# Patient Record
Sex: FEMALE | URBAN - METROPOLITAN AREA
[De-identification: names, ages, dates, MRNs, and addresses within clinical notes are randomized per-mention and may not be internally consistent; named-entity substitution may affect disease eponyms.]

---

## 2018-05-07 PROBLEM — Z00.00 ENCOUNTER FOR PREVENTIVE HEALTH EXAMINATION: Status: ACTIVE | Noted: 2018-05-07

## 2018-05-08 ENCOUNTER — OUTPATIENT (OUTPATIENT)
Dept: OUTPATIENT SERVICES | Facility: HOSPITAL | Age: 68
LOS: 1 days | Discharge: HOME | End: 2018-05-08
Payer: MEDICARE

## 2018-05-09 ENCOUNTER — APPOINTMENT (OUTPATIENT)
Dept: RADIATION ONCOLOGY | Facility: CLINIC | Age: 68
End: 2018-05-09
Payer: MEDICARE

## 2018-05-09 VITALS
TEMPERATURE: 98.3 F | OXYGEN SATURATION: 98 % | RESPIRATION RATE: 16 BRPM | DIASTOLIC BLOOD PRESSURE: 63 MMHG | BODY MASS INDEX: 25.21 KG/M2 | WEIGHT: 137 LBS | HEART RATE: 73 BPM | SYSTOLIC BLOOD PRESSURE: 122 MMHG | HEIGHT: 62 IN

## 2018-05-09 DIAGNOSIS — C80.1 SECONDARY MALIGNANT NEOPLASM OF LIVER AND INTRAHEPATIC BILE DUCT: ICD-10-CM

## 2018-05-09 DIAGNOSIS — Z86.79 PERSONAL HISTORY OF OTHER DISEASES OF THE CIRCULATORY SYSTEM: ICD-10-CM

## 2018-05-09 DIAGNOSIS — C80.1 SECONDARY MALIGNANT NEOPLASM OF BRAIN: ICD-10-CM

## 2018-05-09 DIAGNOSIS — C78.7 SECONDARY MALIGNANT NEOPLASM OF LIVER AND INTRAHEPATIC BILE DUCT: ICD-10-CM

## 2018-05-09 DIAGNOSIS — Z80.0 FAMILY HISTORY OF MALIGNANT NEOPLASM OF DIGESTIVE ORGANS: ICD-10-CM

## 2018-05-09 DIAGNOSIS — C79.31 SECONDARY MALIGNANT NEOPLASM OF BRAIN: ICD-10-CM

## 2018-05-09 PROCEDURE — 99204 OFFICE O/P NEW MOD 45 MIN: CPT | Mod: 25

## 2018-05-09 RX ORDER — SENNOSIDES 8.6 MG TABLETS 8.6 MG/1
8.6 TABLET ORAL
Refills: 0 | Status: ACTIVE | COMMUNITY

## 2018-05-10 ENCOUNTER — OTHER (OUTPATIENT)
Age: 68
End: 2018-05-10

## 2018-05-11 ENCOUNTER — APPOINTMENT (OUTPATIENT)
Dept: HEMATOLOGY ONCOLOGY | Facility: CLINIC | Age: 68
End: 2018-05-11
Payer: MEDICARE

## 2018-05-11 ENCOUNTER — OUTPATIENT (OUTPATIENT)
Dept: OUTPATIENT SERVICES | Facility: HOSPITAL | Age: 68
LOS: 1 days | Discharge: HOME | End: 2018-05-11

## 2018-05-11 VITALS
TEMPERATURE: 97.8 F | SYSTOLIC BLOOD PRESSURE: 109 MMHG | OXYGEN SATURATION: 98 % | BODY MASS INDEX: 25.21 KG/M2 | HEIGHT: 62 IN | WEIGHT: 137 LBS | HEART RATE: 58 BPM | DIASTOLIC BLOOD PRESSURE: 67 MMHG

## 2018-05-11 DIAGNOSIS — C34.90 MALIGNANT NEOPLASM OF UNSPECIFIED PART OF UNSPECIFIED BRONCHUS OR LUNG: ICD-10-CM

## 2018-05-11 PROCEDURE — 99205 OFFICE O/P NEW HI 60 MIN: CPT

## 2018-05-15 ENCOUNTER — APPOINTMENT (OUTPATIENT)
Dept: NEUROSURGERY | Facility: CLINIC | Age: 68
End: 2018-05-15
Payer: MEDICARE

## 2018-05-15 VITALS
TEMPERATURE: 97.7 F | HEIGHT: 62 IN | WEIGHT: 137 LBS | DIASTOLIC BLOOD PRESSURE: 70 MMHG | OXYGEN SATURATION: 98 % | BODY MASS INDEX: 25.21 KG/M2 | HEART RATE: 62 BPM | SYSTOLIC BLOOD PRESSURE: 122 MMHG

## 2018-05-15 PROCEDURE — 99204 OFFICE O/P NEW MOD 45 MIN: CPT

## 2018-05-15 PROCEDURE — 77334 RADIATION TREATMENT AID(S): CPT | Mod: 26

## 2018-05-15 PROCEDURE — 77263 THER RADIOLOGY TX PLNG CPLX: CPT

## 2018-05-15 PROCEDURE — 77290 THER RAD SIMULAJ FIELD CPLX: CPT | Mod: 26

## 2018-05-16 ENCOUNTER — OTHER (OUTPATIENT)
Age: 68
End: 2018-05-16

## 2018-05-16 PROCEDURE — 77307 TELETHX ISODOSE PLAN CPLX: CPT | Mod: 26

## 2018-05-16 PROCEDURE — 77334 RADIATION TREATMENT AID(S): CPT | Mod: 26

## 2018-05-17 ENCOUNTER — OUTPATIENT (OUTPATIENT)
Dept: OUTPATIENT SERVICES | Facility: HOSPITAL | Age: 68
LOS: 1 days | Discharge: HOME | End: 2018-05-17
Payer: MEDICARE

## 2018-05-17 ENCOUNTER — INPATIENT (INPATIENT)
Facility: HOSPITAL | Age: 68
LOS: 7 days | Discharge: HOME | DRG: 54 | End: 2018-05-25
Attending: HOSPITALIST | Admitting: INTERNAL MEDICINE
Payer: MEDICARE

## 2018-05-17 VITALS — WEIGHT: 136.03 LBS | HEIGHT: 62 IN

## 2018-05-17 VITALS
OXYGEN SATURATION: 98 % | SYSTOLIC BLOOD PRESSURE: 118 MMHG | HEIGHT: 62 IN | WEIGHT: 137 LBS | RESPIRATION RATE: 16 BRPM | BODY MASS INDEX: 25.21 KG/M2 | HEART RATE: 79 BPM | DIASTOLIC BLOOD PRESSURE: 67 MMHG

## 2018-05-17 DIAGNOSIS — R29.6 REPEATED FALLS: ICD-10-CM

## 2018-05-17 DIAGNOSIS — Z51.5 ENCOUNTER FOR PALLIATIVE CARE: ICD-10-CM

## 2018-05-17 DIAGNOSIS — Z98.890 OTHER SPECIFIED POSTPROCEDURAL STATES: Chronic | ICD-10-CM

## 2018-05-17 DIAGNOSIS — C79.31 SECONDARY MALIGNANT NEOPLASM OF BRAIN: ICD-10-CM

## 2018-05-17 DIAGNOSIS — C34.90 MALIGNANT NEOPLASM OF UNSPECIFIED PART OF UNSPECIFIED BRONCHUS OR LUNG: ICD-10-CM

## 2018-05-17 LAB
ALBUMIN SERPL ELPH-MCNC: 3.8 G/DL — SIGNIFICANT CHANGE UP (ref 3.3–5.2)
ALP SERPL-CCNC: 59 U/L — SIGNIFICANT CHANGE UP (ref 40–120)
ALT FLD-CCNC: 26 U/L — SIGNIFICANT CHANGE UP
ANION GAP SERPL CALC-SCNC: 13 MMOL/L — SIGNIFICANT CHANGE UP (ref 5–17)
APPEARANCE UR: CLEAR — SIGNIFICANT CHANGE UP
APTT BLD: 23.8 SEC — LOW (ref 27.5–37.4)
AST SERPL-CCNC: 15 U/L — SIGNIFICANT CHANGE UP
BASOPHILS # BLD AUTO: 0 K/UL — SIGNIFICANT CHANGE UP (ref 0–0.2)
BASOPHILS NFR BLD AUTO: 0.1 % — SIGNIFICANT CHANGE UP (ref 0–2)
BILIRUB SERPL-MCNC: 0.7 MG/DL — SIGNIFICANT CHANGE UP (ref 0.4–2)
BILIRUB UR-MCNC: NEGATIVE — SIGNIFICANT CHANGE UP
BUN SERPL-MCNC: 22 MG/DL — HIGH (ref 8–20)
CALCIUM SERPL-MCNC: 9.1 MG/DL — SIGNIFICANT CHANGE UP (ref 8.6–10.2)
CHLORIDE SERPL-SCNC: 100 MMOL/L — SIGNIFICANT CHANGE UP (ref 98–107)
CO2 SERPL-SCNC: 25 MMOL/L — SIGNIFICANT CHANGE UP (ref 22–29)
COLOR SPEC: YELLOW — SIGNIFICANT CHANGE UP
CREAT SERPL-MCNC: 0.6 MG/DL — SIGNIFICANT CHANGE UP (ref 0.5–1.3)
DIFF PNL FLD: NEGATIVE — SIGNIFICANT CHANGE UP
EOSINOPHIL # BLD AUTO: 0 K/UL — SIGNIFICANT CHANGE UP (ref 0–0.5)
EOSINOPHIL NFR BLD AUTO: 0 % — SIGNIFICANT CHANGE UP (ref 0–6)
EPI CELLS # UR: SIGNIFICANT CHANGE UP
GLUCOSE SERPL-MCNC: 132 MG/DL — HIGH (ref 70–115)
GLUCOSE UR QL: NEGATIVE MG/DL — SIGNIFICANT CHANGE UP
HCT VFR BLD CALC: 41.4 % — SIGNIFICANT CHANGE UP (ref 37–47)
HGB BLD-MCNC: 13.8 G/DL — SIGNIFICANT CHANGE UP (ref 12–16)
INR BLD: 0.95 RATIO — SIGNIFICANT CHANGE UP (ref 0.88–1.16)
KETONES UR-MCNC: NEGATIVE — SIGNIFICANT CHANGE UP
LEUKOCYTE ESTERASE UR-ACNC: ABNORMAL
LYMPHOCYTES # BLD AUTO: 1 K/UL — SIGNIFICANT CHANGE UP (ref 1–4.8)
LYMPHOCYTES # BLD AUTO: 8.1 % — LOW (ref 20–55)
MCHC RBC-ENTMCNC: 31.4 PG — HIGH (ref 27–31)
MCHC RBC-ENTMCNC: 33.3 G/DL — SIGNIFICANT CHANGE UP (ref 32–36)
MCV RBC AUTO: 94.3 FL — SIGNIFICANT CHANGE UP (ref 81–99)
MONOCYTES # BLD AUTO: 0.7 K/UL — SIGNIFICANT CHANGE UP (ref 0–0.8)
MONOCYTES NFR BLD AUTO: 5.7 % — SIGNIFICANT CHANGE UP (ref 3–10)
NEUTROPHILS # BLD AUTO: 10.5 K/UL — HIGH (ref 1.8–8)
NEUTROPHILS NFR BLD AUTO: 85.6 % — HIGH (ref 37–73)
NITRITE UR-MCNC: NEGATIVE — SIGNIFICANT CHANGE UP
PH UR: 5 — SIGNIFICANT CHANGE UP (ref 5–8)
PLATELET # BLD AUTO: 217 K/UL — SIGNIFICANT CHANGE UP (ref 150–400)
POTASSIUM SERPL-MCNC: 4.2 MMOL/L — SIGNIFICANT CHANGE UP (ref 3.5–5.3)
POTASSIUM SERPL-SCNC: 4.2 MMOL/L — SIGNIFICANT CHANGE UP (ref 3.5–5.3)
PROT SERPL-MCNC: 6.5 G/DL — LOW (ref 6.6–8.7)
PROT UR-MCNC: 15 MG/DL
PROTHROM AB SERPL-ACNC: 10.4 SEC — SIGNIFICANT CHANGE UP (ref 9.8–12.7)
RBC # BLD: 4.39 M/UL — LOW (ref 4.4–5.2)
RBC # FLD: 15.3 % — SIGNIFICANT CHANGE UP (ref 11–15.6)
SODIUM SERPL-SCNC: 138 MMOL/L — SIGNIFICANT CHANGE UP (ref 135–145)
SP GR SPEC: 1.02 — SIGNIFICANT CHANGE UP (ref 1.01–1.02)
UROBILINOGEN FLD QL: NEGATIVE MG/DL — SIGNIFICANT CHANGE UP
WBC # BLD: 12.3 K/UL — HIGH (ref 4.8–10.8)
WBC # FLD AUTO: 12.3 K/UL — HIGH (ref 4.8–10.8)
WBC UR QL: SIGNIFICANT CHANGE UP

## 2018-05-17 PROCEDURE — 99497 ADVNCD CARE PLAN 30 MIN: CPT | Mod: 25

## 2018-05-17 PROCEDURE — 99285 EMERGENCY DEPT VISIT HI MDM: CPT | Mod: 25

## 2018-05-17 PROCEDURE — 70450 CT HEAD/BRAIN W/O DYE: CPT | Mod: 26

## 2018-05-17 PROCEDURE — 77280 THER RAD SIMULAJ FIELD SMPL: CPT | Mod: 26

## 2018-05-17 PROCEDURE — 99223 1ST HOSP IP/OBS HIGH 75: CPT

## 2018-05-17 PROCEDURE — 99358 PROLONG SERVICE W/O CONTACT: CPT

## 2018-05-17 RX ORDER — DEXAMETHASONE 0.5 MG/5ML
4 ELIXIR ORAL ONCE
Qty: 0 | Refills: 0 | Status: COMPLETED | OUTPATIENT
Start: 2018-05-17 | End: 2018-05-17

## 2018-05-17 RX ORDER — DEXAMETHASONE 0.5 MG/5ML
4 ELIXIR ORAL EVERY 6 HOURS
Qty: 0 | Refills: 0 | Status: DISCONTINUED | OUTPATIENT
Start: 2018-05-17 | End: 2018-05-18

## 2018-05-17 RX ORDER — PANTOPRAZOLE SODIUM 20 MG/1
40 TABLET, DELAYED RELEASE ORAL
Qty: 0 | Refills: 0 | Status: DISCONTINUED | OUTPATIENT
Start: 2018-05-17 | End: 2018-05-25

## 2018-05-17 RX ORDER — SODIUM CHLORIDE 9 MG/ML
3 INJECTION INTRAMUSCULAR; INTRAVENOUS; SUBCUTANEOUS ONCE
Qty: 0 | Refills: 0 | Status: COMPLETED | OUTPATIENT
Start: 2018-05-17 | End: 2018-05-17

## 2018-05-17 RX ORDER — ENOXAPARIN SODIUM 100 MG/ML
40 INJECTION SUBCUTANEOUS DAILY
Qty: 0 | Refills: 0 | Status: DISCONTINUED | OUTPATIENT
Start: 2018-05-17 | End: 2018-05-25

## 2018-05-17 RX ADMIN — SODIUM CHLORIDE 3 MILLILITER(S): 9 INJECTION INTRAMUSCULAR; INTRAVENOUS; SUBCUTANEOUS at 04:55

## 2018-05-17 RX ADMIN — Medication 4 MILLIGRAM(S): at 23:06

## 2018-05-17 RX ADMIN — PANTOPRAZOLE SODIUM 40 MILLIGRAM(S): 20 TABLET, DELAYED RELEASE ORAL at 17:01

## 2018-05-17 RX ADMIN — Medication 4 MILLIGRAM(S): at 13:37

## 2018-05-17 RX ADMIN — ENOXAPARIN SODIUM 40 MILLIGRAM(S): 100 INJECTION SUBCUTANEOUS at 11:16

## 2018-05-17 RX ADMIN — Medication 4 MILLIGRAM(S): at 17:02

## 2018-05-17 RX ADMIN — Medication 4 MILLIGRAM(S): at 07:33

## 2018-05-17 NOTE — H&P ADULT - ASSESSMENT
Metastatic Lung Ca- mets to brain + liver. regarding brain mets, noted to have mult supra + infratentorial brain mets containing blood products/debris w/ necrosis + Lt temporal horn effacement complicated by mass effect causing subfalcine herniation as per CTH. recently dx @ [facility] 1wk prior to admission. sp liver bx for confirmation @ [facility], pending result. complicated by severe incoordination, RUE weakness, cognitive impairment, + intermittent receptive aphasia. PLAN. radiation therapy to brain per Dr Mondragon. Palliative consult. neurosx consult appreciated, poor prognosis, no sx intervention offered @ this time. pt family cannot care for her needs @ this time given advanced disease with complications, may not be safe dc home unless regular care provided, good candidate for hospice.    Gait instability - complicated by frequent falls. most recent fall just prior to admit to Rt frontoparietal head. likely sec to brain mets causing gait isntability. . PLAN. PT/OT consult.     Leukocytosis- mild. no signs active infection. most likely sec to malingnacy. PLAN. check UA to r/o underlying UTI as poss alternative cause to symptoms although concede given extent of abn findings on CTH, it is more likely due to mets. 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     Metastatic Lung Ca- mets to brain + liver. regarding brain mets, noted to have mult supra + infratentorial brain mets containing blood products/debris w/ necrosis + Lt temporal horn effacement complicated by mass effect causing subfalcine herniation as per CTH. recently dx @ [facility] 1wk prior to admission. sp liver bx for confirmation @ [facility], pending result. complicated by severe incoordination, RUE weakness, cognitive impairment, + intermittent receptive aphasia. PLAN. radiation therapy to brain per Dr Mondragon. Palliative consult. neurosx consult appreciated, poor prognosis, no sx intervention offered @ this time. pt family cannot care for her needs @ this time given advanced disease with complications, may not be safe dc home unless regular care provided, good candidate for hospice.    Gait instability - complicated by frequent falls. most recent fall just prior to admit to Rt frontoparietal head. likely sec to brain mets causing gait isntability. . PLAN. PT/OT consult.     Leukocytosis- mild. no signs active infection. most likely sec to malingnacy. PLAN. check UA to r/o underlying UTI as poss alternative cause to symptoms although concede given extent of abn findings on CTH, it is more likely due to mets.

## 2018-05-17 NOTE — ED PROVIDER NOTE - PROGRESS NOTE DETAILS
Case d/w Radiation Oncolgy Dr. Walton who spoke with Dr. Mondragon and recommending admission with eval Palliative/Hospice Care and to continue plan to start radiation later today as scheduled.  Call placed to Hospitalist/Dr. Torres

## 2018-05-17 NOTE — CONSULT NOTE ADULT - SUBJECTIVE AND OBJECTIVE BOX
SOURCE: Patient, son, ex , Son is competent source.   HPI: Patient is a 68y old  Female who presents with a chief complaint of fall with hit to right frontoparietal head. No recall of fall, denies and head ache, dizziness, admits to frequent falls due to balance problem. Admits to RUE weakness. Patient with poor recall of recent hx. Son states she started having right hand weakness 4 wks ago that progressed proximally, now can not move r arm, she has fallen 20 times this week due to poor balance and incoordination of legs. Poor cognition and recall this week. Had work up at [facility] last week that DX lung ca with mets to liver and brain. Patient  has appt today w Dr Mondragon for rad onc.     -headache  or dizziness    - Nausea / - Vomiting  admits RUE weakness   denies numbness/ tingling   denies visual changes   denies C/T/LS  Spine pain    PAST MEDICAL:  Lung mass  Liver mass  Brain metastases     SURGICAL HISTORY:  LIVER BX  last week at [facility]      SOCIAL HISTORY:  - EtOH, +  tobacco1 1/2 PPD X'S 40+ YRS ,  - drugs    FAMILY HISTORY: NONE KNOWN      ROS:  CONSTITUTIONAL: No fever, weight loss, or fatigue  EYES: No eye pain, visual disturbances, or discharge  ENMT:  No difficulty hearing, tinnitus, vertigo; No sinus or throat pain  NECK: No pain or stiffness  RESPIRATORY: No cough, wheezing, chills or hemoptysis; No shortness of breath  CARDIOVASCULAR: No chest pain, palpitations, dizziness, or leg swelling  GASTROINTESTINAL: No abdominal or epigastric pain. No nausea, vomiting, or hematemesis; No diarrhea or constipation. No melena or hematochezia.  GENITOURINARY: No dysuria, frequency, hematuria, or incontinence  NEUROLOGICAL: No headaches, memory loss, loss of strength, numbness, or tremors  SKIN: No itching, burning, rashes, or lesions   LYMPH NODES: No enlarged glands  ENDOCRINE: No heat or cold intolerance; No hair loss  MUSCULOSKELETAL: No joint pain or swelling; No muscle, back, or extremity pain  PSYCHIATRIC: No depression, anxiety, mood swings, or difficulty sleeping  HEME/LYMPH: No easy bruising, or bleeding gums  ALLERY AND IMMUNOLOGIC: No hives or eczema      MEDICATIONS: DECADRON 4 Q12H, KEPPRA Q12H    Allergies: No Known Allergies    Vital Signs Last 24 Hrs  T(C): 36.8 (17 May 2018 02:46), Max: 36.8 (17 May 2018 02:46)  T(F): 98.2 (17 May 2018 02:46), Max: 98.2 (17 May 2018 02:46)  HR: 67 (17 May 2018 02:46) (67 - 67)  BP: 110/69 (17 May 2018 02:46) (110/69 - 110/69)  BP(mean): --  RR: 20 (17 May 2018 02:46) (20 - 20)  SpO2: 99% (17 May 2018 02:46) (99% - 99%)    PHYSICAL EXAM:  GENERAL: NAD, well-groomed, well-developed  HEAD:  RIGHT FRONTOPARIETAL HEMATOMA MIXED AGE, MINOR TENDERNESS, SKIN INTACT,  Normocephalic  EYES: EOMI, PERRLA, conjunctiva and sclera clear  NECK: Supple, No JVD  NERVOUS SYSTEM:  Alert , NOT Oriented To date, , Good concentration;  PERRLA, EOMI, GROSS VISUAL ACUITY AND FIELDS INTACT, CRANIAL NERVES 2-12 INTACT,  SPEECH INTACT, POOR RECALL, POSS RECEPTIVE APHASIA,    Motor Strength 5/5 LEFT UPPER AND LOWER EXTREM,  RUE 2/5 TRAP/DELT/BICEP/TRICEP/GRASP  SENSORY INTACT ALL, ; DTRs 2+ intact and symmetric, UNABLE TO FOLLOW INSTRUCTIONS FOR FINE MOTOR AND COORDINATION  CHEST/LUNG: Clear bilaterally; No rales, rhonchi, wheezing, or rubs, NO STERNAL OR RIB TENDERNESS  SPINE: NO C/T/L/S SPINE TENDERNESS  HEART: Regular rate   ABDOMEN: Soft, Nontender, Nondistended; Bowel sounds present  EXTREMITIES:  2+ Peripheral Pulses, No clubbing, cyanosis, or edema  LYMPH: No lymphadenopathy noted  SKIN: No rashes or lesions      RADIOLOGY & ADDITIONAL STUDIES:         CT HEAD:  There are multiple supratentorial and infratentorial metastatic   lesions with varying attenuation and vasogenic edema, for example, 2.8 x 2.1   cm in the left medial temporal lobe (2:18), 1.1 x 1.3 cm splenium of the   left corpus callosum (2:28), 2.5 x 1.9 cm and 0.8 x 0.8 cm in the right   cerebellum (2:15), 0.7 x 0.7 cm in the right lateral cerebellum (2:14), 1.3   x 1.1 cm (2:35) and 1.5 x 1.4 cm in the left centrum semiovale (2:32), 0.7 x   0.7 cm in the right frontal region (2:17), 0.6 x 0.5 cm at the right frontal   vertex (4:32) and 1.0 x 0.8 cm in the right anterior frontal region (2:39).   Vasogenic edema is also noted in the left cerebellum. Some of the metastatic   lesions demonstrate increased attenuation, which may contain blood   products/proteinaceous debris. Some of the metastatic lesions, for example,   in the left centrum semiovale, left medial temporal lobe, right anterior   frontal lobe and right cerebellum demonstrate central hyperattenuation,   indicating necrosis. The left temporal metastatic lesion resultant mass   effect with effacement of the left temporal horn and a 0.3 cm rightward   midline shift, indicating subfalcine herniation. There is also mild mass   effect on the left frontal horn by the necrotic left centrum semiovale   lesion.   Diffuse periventricular and subcortical white matter lucencies may related   to chronic microvascular ischemic changes and/or vasogenic edema. Small   lucencies in the feroz may be related to artifact or age-indeterminate   infarcts although additional metastasis cannot be excluded. There is mild   cerebral volume loss with commensurate ventricular dilatation.   There is no depressed skull fracture. There is minimal mucosal thickening of   the sinuses. The tympanomastoid region is clear.   Impression:   Multiple supratentorial and infratentorial metastatic lesions with some   lesions containing blood products/debris and necrosis as described. The left   temporal metastatic lesion resultant mass effect with effacement of the left   temporal horn and a 0.3 cm rightward midline shift, indicating subfalcine   herniation.               13.8   12.3  )-----------( 217      ( 17 May 2018 04:44 )             41.4

## 2018-05-17 NOTE — PHYSICAL THERAPY INITIAL EVALUATION ADULT - ADDITIONAL COMMENTS
Pt is poor historian reports living thalia 2 story house with 6 steps to enter, with children.  Unable to obtain PLF and amount of assist that she receives from children.

## 2018-05-17 NOTE — ED ADULT NURSE REASSESSMENT NOTE - NS ED NURSE REASSESS COMMENT FT1
MD Torres at bedside.
Neuro Surgery at bedside with pt.
Patient placed on 1:1 constant observation for safety, patient attempting to leave stretcher, patient is a high risk fall, no following commands, restless at times, will monitor patient.
pt in no apparent distress, IV patent and flushing without difficult  no signs of infiltration. 1;1 in place safety maintained.

## 2018-05-17 NOTE — PHYSICAL THERAPY INITIAL EVALUATION ADULT - DIAGNOSIS, PT EVAL
Decreased functional mobility secondary to right hemiparesis decreased cognition, endurance and balance

## 2018-05-17 NOTE — H&P ADULT - PMH
Brain metastases    Liver mass    Malignant neoplasm of lung, unspecified laterality, unspecified part of lung

## 2018-05-17 NOTE — H&P ADULT - NSHPLABSRESULTS_GEN_ALL_CORE
05-17    138  |  100  |  22.0<H>  ----------------------------<  132<H>  4.2   |  25.0  |  0.60    Ca    9.1      17 May 2018 04:44    TPro  6.5<L>  /  Alb  3.8  /  TBili  0.7  /  DBili  x   /  AST  15  /  ALT  26  /  AlkPhos  59  05-17                          13.8   12.3  )-----------( 217      ( 17 May 2018 04:44 )             41.4     LIVER FUNCTIONS - ( 17 May 2018 04:44 )  Alb: 3.8 g/dL / Pro: 6.5 g/dL / ALK PHOS: 59 U/L / ALT: 26 U/L / AST: 15 U/L / GGT: x           PT/INR - ( 17 May 2018 04:44 )   PT: 10.4 sec;   INR: 0.95 ratio         PTT - ( 17 May 2018 04:44 )  PTT:23.8 sec

## 2018-05-17 NOTE — CONSULT NOTE ADULT - SUBJECTIVE AND OBJECTIVE BOX
Palliative Medicine Initial Consultation Note    HPI:  History from chart- patient poor historian  SOURCE: Patient, son, ex , Son is competent source.   HPI: Patient is a 68y old  Female who presents with a chief complaint of fall with hit to right frontoparietal head. No recall of fall, denies and head ache, dizziness, admits to frequent falls due to balance problem. Admits to RUE weakness. Patient with poor recall of recent hx. Son states she started having right hand weakness 4 wks ago that progressed proximally, now can not move r arm, she has fallen 20 times this week due to poor balance and incoordination of legs. Poor cognition and recall this week. Had work up at [facility] last week that DX lung ca with mets to liver and brain. Patient  has appt today w Dr Mondragon for rad onc.      PERTINENT PMH REVIEWED:  [ ] YES [ ] NO         Lung mass  Liver mass  Brain metastases     SURGICAL HISTORY:  LIVER BX  last week at [facility]        SOCIAL HISTORY:  EtOH [ ] Yes  [x ] No                                    Drugs [ ] Yes [x ] No                                   [ x] smoker [ ] nonsmoker                                    Admitted from: [ ] home [ ] SNF _________ [ ] [facility] ________    Surrogate/HCP/Guardian: No HCP form in chart - son is likely surrogate    FAMILY HISTORY:      Baseline ADLs (prior to admission):  unable to obtain  Independent [ ] moderately [ ] fully   Dependent   [ ] moderately [ ]fully    MEDICATIONS  (STANDING):  enoxaparin Injectable 40 milliGRAM(s) SubCutaneous daily    MEDICATIONS  (PRN):      Allergies    No Known Allergies    Intolerances        REVIEW OF SYSTEMS       [ x] Unable to obtain due to poor mentation     Karnofsky Performance Score/Palliative Performance Status Version 2:  30     %    Vital Signs Last 24 Hrs  T(C): 36.7 (17 May 2018 11:26), Max: 36.8 (17 May 2018 02:46)  T(F): 98.1 (17 May 2018 11:26), Max: 98.2 (17 May 2018 02:46)  HR: 77 (17 May 2018 11:26) (64 - 77)  BP: 111/58 (17 May 2018 11:26) (110/69 - 129/62)  BP(mean): --  RR: 18 (17 May 2018 11:26) (16 - 20)  SpO2: 97% (17 May 2018 11:26) (97% - 99%)    PHYSICAL EXAM:    General:  WD woman, alert oriented x1 NAD   HEENT: [ x] normal  [ ] dry mouth  [ ] ET tube/trach    Lungs: [ x] comfortable [ ] tachypnea/labored breathing  [ ] excessive secretions    CV: [x ] normal  [ ] tachycardia    GI: [x ] normal  [ ] distended  [ ] tender  [ ] no BS               [ ] PEG/NG/OG tube    : [ x] normal  [ ] incontinent  [ ] oliguria/anuria  [ ] hudson    MSK: [ ] normal  [ x] weakness  [ ] edema             [ ] ambulatory  [ ] bedbound/wheelchair bound    Skin: [ ] normal  [ ] pressure ulcers- Stage_____  [ x] no rash    LABS:                        13.8   12.3  )-----------( 217      ( 17 May 2018 04:44 )             41.4     05-17    138  |  100  |  22.0<H>  ----------------------------<  132<H>  4.2   |  25.0  |  0.60    Ca    9.1      17 May 2018 04:44    TPro  6.5<L>  /  Alb  3.8  /  TBili  0.7  /  DBili  x   /  AST  15  /  ALT  26  /  AlkPhos  59  05-17    PT/INR - ( 17 May 2018 04:44 )   PT: 10.4 sec;   INR: 0.95 ratio         PTT - ( 17 May 2018 04:44 )  PTT:23.8 sec    I&O's Summary      RADIOLOGY & ADDITIONAL STUDIES:  < from: CT Head No Cont (05.17.18 @ 04:02) >  Impression:     Multiple supratentorial and infratentorial metastatic lesions with some   lesions containing blood products/debris and necrosis as described. The   left temporal metastatic lesion resultant mass effect with effacement of   the left temporal horn and a 0.3 cm rightward midline shift, indicating   subfalcine herniation. Recommend comparison to previous outside study. If   a prior study is not available, contrast-enhanced MRI may be obtained for   further evaluation.    < end of copied text >        ADVANCE DIRECTIVES:  [ ] YES [x ] NO   DNR [ ] YES [x ] NO  Completed on:                     MOLST  [ ] YES [x ] NO   Completed on:  Living Will  [ ] YES [ x] NO   Completed on:    Thank you for the opportunity to assist with the care of this patient.   [facility] [phone].

## 2018-05-17 NOTE — ED ADULT NURSE NOTE - CHIEF COMPLAINT QUOTE
patient arrived via wheelchair - family states that two weeks ago she was diagnosed with brain tumors and possible mets ( still awaiting biopsy resutls) patient came to er for frequent falls, as per oncologist was told to come here due to radiation appt [place] [address] [place] -   patient with ecchymotic area to right forehead son states that it is old, but due to falls she continues to hit that side - patient with decreased movement to right side which is not new.  son states that no matter what obstacle they use to block her from getting out of bed, she manages to get around it and fall, and was told by radiation / oncologist to take her to ER if falls continued

## 2018-05-17 NOTE — CONSULT NOTE ADULT - ASSESSMENT
IMP:  Multiple supratentorial and infratentorial metastatic lesions with some   lesions containing blood products/debris and necrosis as described. The left   temporal metastatic lesion resultant mass effect with effacement of the left   temporal horn and a 0.3 cm rightward midline shift, indicating subfalcine   herniation.    Fall hit to head, with frequent falls this week.  Increased RUE weakness, worsening balance, recall, cognition,  receptive aphasia intermittent.     New masses lung/liver and brain.   Patient has neurologist and rad and heme onc   has also seen pulmonary and surgeon at [facility]     Path pending liver bx last week
68 yr woman with metastatic lung cancer, mets to live and brain. Brain lesions with blood products/debris w/ necrosis + Lt temporal horn effacement complicated by mass effect causing subfalcine herniation. Poor outllook

## 2018-05-17 NOTE — ED PROVIDER NOTE - MEDICAL DECISION MAKING DETAILS
Will check STAT CT head, check labs, attempt to obtain records from [facility] and consult Neurosurgery as needed

## 2018-05-17 NOTE — ED PROVIDER NOTE - OBJECTIVE STATEMENT
69 yo F presents to ED brought by family after being referred  to ED by Radiation Oncology/Dr. Mondragon.  As per pt's son she was admitted to [facility] approx 2 weeks ago after having outpt brain MRI for eval of RUE numbness and found to have metastatic lesions from presumed liver mass which was biopsied with no final pathology at this time. Pt with increasing weakness to RUE >> RLE and increasing frequency of falls-most recently yesterday

## 2018-05-17 NOTE — H&P ADULT - NSHPREVIEWOFSYSTEMS_GEN_ALL_CORE
Poor historian but she denies fevers, chills, diaphoresis, poor appetite, altered mental status, is unsure about LOC, denies seizure, headache, lightheadedness, dizziness, nasal congestion, dysphagia/odynophagia, hearing loss, chest pain, palpitations, shortness of breath, cough, wheezing/stridor, abdominal pain/cramping, nausea, vomiting, diarrhea, constipation

## 2018-05-17 NOTE — PHYSICAL THERAPY INITIAL EVALUATION ADULT - RANGE OF MOTION EXAMINATION, REHAB EVAL
Left UE ROM was WNL (within normal limits)/Right LE ROM was WFL (within functional limits)/Right UE limited due to increased tone/Left LE ROM was WNL (within normal limits)

## 2018-05-17 NOTE — PHYSICAL THERAPY INITIAL EVALUATION ADULT - IMPAIRMENTS FOUND, PT EVAL
gross motor/neuromotor development and sensory integration/cognitive impairment/fine motor/muscle strength/poor safety awareness/tone/gait, locomotion, and balance

## 2018-05-17 NOTE — ED ADULT TRIAGE NOTE - CHIEF COMPLAINT QUOTE
patient arrived via wheelchair - family states that two weeks ago she was diagnosed with brain tumors and possible mets ( still awaiting biopsy resutls) patient came to er for frequent falls, as per oncologist was told to come here due to radiation appt tomorrow [address] site - patient with ecchymotic area to right forehead son states that it is old, but due to falls she continues to hit that side - patient with decreased movment to right side.  son states that no matter what obstacle they use to block her from getting out of bed, she manages to get around it and fall patient arrived via wheelchair - family states that two weeks ago she was diagnosed with brain tumors and possible mets ( still awaiting biopsy resutls) patient came to er for frequent falls, as per oncologist was told to come here due to radiation appt tomorrow [address] site -   patient with ecchymotic area to right forehead son states that it is old, but due to falls she continues to hit that side - patient with decreased movement to right side which is not new.  son states that no matter what obstacle they use to block her from getting out of bed, she manages to get around it and fall, and was told by radiation / oncologist to take her to ER if falls continued

## 2018-05-17 NOTE — H&P ADULT - ATTENDING COMMENTS
At this time the patient is in a hemodynamic state that requires hospital level of care/treatment. This has been explained to the patient and they are agreeable to the terms of admittance and continued care.     Length of Admission: 75min Goals of Care discussed at length with the patient's son (In depth phone conversation). Held on the phone due to pt not being AAOx3 and lacking capacity for medical decision making. This conversation included current condition, prognosis, and options for therapy. Discussed desires by patient for further care and wishes were expressed - palliative measures desired, and poss hospice, they understand cure is unlikely. Advanced directives to be discussed in person tomorrow with Son, Palliative, and myself.     Conversation start time:1600  Conversation end time: 1635  Total time of conversation: 35min    At this time the patient is in a hemodynamic state that requires hospital level of care/treatment. This has been explained to the patient and they are agreeable to the terms of admittance and continued care.     Length of Admission: 75min

## 2018-05-17 NOTE — PHYSICAL THERAPY INITIAL EVALUATION ADULT - GAIT DEVIATIONS NOTED, PT EVAL
decreased nik/decreased step length/decreased stride length/decreased weight-shifting ability/footdrop

## 2018-05-17 NOTE — ED ADULT NURSE NOTE - ATTEMPT TO OOB
A So Protect Me message was sent for patient to schedule with Dr. Oconnell for an office visit. Tessa Hardin,    no

## 2018-05-17 NOTE — CONSULT NOTE ADULT - ATTENDING COMMENTS
Plan: Plan: DISCUSSED WITH DR BOLTON  PATIENT KNOWN TO HIM AS OUTPATIENT OFFICE VISIT  UNFORTUNATELY PATIENT WITH MULTIPLE BRAIN METS,  PROGNOSIS POOR    NO NEUROSURGICAL INTERVENTION  MANAGEMENT AS PER RAD AND HEME ONC Plan: DISCUSSED WITH DR BOLTON  PATIENT KNOWN TO HIM AS OUTPATIENT OFFICE VISIT EARLIER THIS WEEK  UNFORTUNATELY PATIENT WITH MULTIPLE BRAIN METS,  PROGNOSIS POOR    NO NEUROSURGICAL INTERVENTION  MANAGEMENT AS PER RAD AND HEME ONC    NSGY Attg:    see above  patient known to me from outpatient evaluation earlier this week; MRI reviewed at that time  no neurosurgical intervention at this time  plan per onc/rad onc  recall prn

## 2018-05-17 NOTE — H&P ADULT - NSHPPHYSICALEXAM_GEN_ALL_CORE
T(C): 36.7 (05-17-18 @ 15:44), Max: 36.9 (05-17-18 @ 13:44)  HR: 76 (05-17-18 @ 15:44) (64 - 81)  BP: 109/60 (05-17-18 @ 15:44) (109/60 - 129/62)  RR: 18 (05-17-18 @ 15:44) (16 - 20)  SpO2: 94% (05-17-18 @ 15:44) (94% - 99%)    GEN - appears age appropriate. well nourished. pleasant. no distress.   HEENT - NCAT, EOMI, MAYE  RESP - CTA BL, no wheeze/stridor/rhonchi/crackles. not on supplemental O2.  CARDIO - NS1S2, RRR. No murmurs/rubs/gallops.  ABD - Soft/Non tender/Non distended. Normal BS x4 quadrants.   Ext - No FÉLIX. no signs of venous/arterial stasis ulcers  MSK - full ROM of BL upper and lower extremities without pain or restriction. preserved 5/5 Lt upper + LE strenght. 4/5 RLE strength, 2-3/5 RUE strength  Neuro - cn 2-12 grossly intact. no visible seizure activity appreciated. LUE mild tremor. gait not observed.   Skin - clean, dry, intact. no rashes or lesions.    Psych- AAOx1. intermittently compliant with questioning and physical exam requests. flight of ideas. dismayed @ being in hospital.

## 2018-05-17 NOTE — H&P ADULT - HISTORY OF PRESENT ILLNESS
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 Case discussed @ length with son, pt is poor historian due to being aaox2. Per son, pt recently with onset of Rt sided weakness several weeks ago prompting eval in ER @ [facility] to r/o CVA. At that time noted to have mets to the brain and on futher exam seen to have lung mass suspected to be primary lesion with mets also involving the liver. Liver bx taken, results pending. Since discharge from [facility], pt managed @ home by son but noted to have worsening complications of altered personality increased paranoia and agitation, and gait instability. Has had approx 20 falls since dx. Last fall prompting presentation to the hospital at this time given trauma to the head. Cannot assess whether LOC was present or not. Pt has become an extreme risk at home because of agitation prompting her to constantly attempt to ambulate or climb out of bed alone and has required 24hr surveillance by family.

## 2018-05-17 NOTE — ED ADULT NURSE NOTE - OBJECTIVE STATEMENT
Patient presents to ER for medical evaluation, patient's family reports patient frequently falling, patient was found in bedroom on the floor, hematoma noted on right temporal, patient is disoriented, does not remember event, patient currently under care of oncology due to recent diagnosis of brain mass, patient's family reports no PMH, patient denies pain.

## 2018-05-18 VITALS
OXYGEN SATURATION: 99 % | HEIGHT: 62 IN | BODY MASS INDEX: 25.21 KG/M2 | SYSTOLIC BLOOD PRESSURE: 120 MMHG | DIASTOLIC BLOOD PRESSURE: 66 MMHG | RESPIRATION RATE: 16 BRPM | HEART RATE: 72 BPM | WEIGHT: 137 LBS

## 2018-05-18 DIAGNOSIS — R26.81 UNSTEADINESS ON FEET: ICD-10-CM

## 2018-05-18 PROCEDURE — 99233 SBSQ HOSP IP/OBS HIGH 50: CPT

## 2018-05-18 PROCEDURE — 99232 SBSQ HOSP IP/OBS MODERATE 35: CPT

## 2018-05-18 PROCEDURE — 99497 ADVNCD CARE PLAN 30 MIN: CPT

## 2018-05-18 PROCEDURE — 99222 1ST HOSP IP/OBS MODERATE 55: CPT | Mod: GC

## 2018-05-18 RX ORDER — DEXAMETHASONE 0.5 MG/5ML
4 ELIXIR ORAL EVERY 6 HOURS
Qty: 0 | Refills: 0 | Status: DISCONTINUED | OUTPATIENT
Start: 2018-05-18 | End: 2018-05-21

## 2018-05-18 RX ORDER — LEVETIRACETAM 250 MG/1
750 TABLET, FILM COATED ORAL
Qty: 0 | Refills: 0 | Status: DISCONTINUED | OUTPATIENT
Start: 2018-05-18 | End: 2018-05-25

## 2018-05-18 RX ADMIN — ENOXAPARIN SODIUM 40 MILLIGRAM(S): 100 INJECTION SUBCUTANEOUS at 11:18

## 2018-05-18 RX ADMIN — Medication 4 MILLIGRAM(S): at 05:40

## 2018-05-18 RX ADMIN — Medication 4 MILLIGRAM(S): at 23:05

## 2018-05-18 RX ADMIN — PANTOPRAZOLE SODIUM 40 MILLIGRAM(S): 20 TABLET, DELAYED RELEASE ORAL at 05:41

## 2018-05-18 RX ADMIN — LEVETIRACETAM 750 MILLIGRAM(S): 250 TABLET, FILM COATED ORAL at 19:50

## 2018-05-18 RX ADMIN — Medication 4 MILLIGRAM(S): at 19:50

## 2018-05-18 RX ADMIN — Medication 4 MILLIGRAM(S): at 11:19

## 2018-05-18 NOTE — CHART NOTE - NSCHARTNOTEFT_GEN_A_CORE
Goals of Care discussed at length with the patient's HCP (daughter) + Palliative attending Dr Mendieta. This conversation included current condition, prognosis, and options for therapy. Discussed desires by patient for further care and wishes were expressed - daughter understands severity of illness but would like a dx to be fully established. she would like to know the overall likelihood of response to treatment based on that so that she can establish realistic expectations for her mothers potential for recovery. She seems somewhat at odds with the outlook of her brother concerning her mother's prognosis stating "my brother thinks shes already in the coffin". Advanced directives deferred, family not ready yet to decide, they would like dx first. Length of Conversation ~35min.

## 2018-05-18 NOTE — OCCUPATIONAL THERAPY INITIAL EVALUATION ADULT - NS ASR FOLLOW COMMAND OT EVAL
able to follow single-step instructions/Pt requries increased time and repetition to process commands. Pt with decreased sustained attention, easily distracted. Pt with decreased functional recall and with difficulty recalling information./50% of the time

## 2018-05-18 NOTE — PROGRESS NOTE ADULT - ASSESSMENT
68 yr woman with metastatic lung cancer, mets to live and brain. Brain lesions with blood products/debris w/ necrosis + Lt temporal horn effacement complicated by mass effect causing subfalcine herniation. Poor outllook

## 2018-05-18 NOTE — PROGRESS NOTE ADULT - SUBJECTIVE AND OBJECTIVE BOX
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 Case discussed @ length with son, pt is poor historian due to being aaox2. Per son, pt recently with onset of Rt sided weakness several weeks ago prompting eval in ER @ [facility] to r/o CVA. At that time noted to have mets to the brain and on futher exam seen to have lung mass suspected to be primary lesion with mets also involving the liver. Liver bx taken, results pending. Since discharge from [facility], pt managed @ home by son but noted to have worsening complications of altered personality increased paranoia and agitation, and gait instability. Has had approx 20 falls since dx. Last fall prompting presentation to the hospital at this time given trauma to the head. Cannot assess whether LOC was present or not. Pt has become an extreme risk at home because of agitation prompting her to constantly attempt to ambulate or climb out of bed alone and has required 24hr surveillance by family. (17 May 2018 08:32)      REVIEW OF SYSTEMS:  Constitutional, Eyes, ENT, Cardiovascular, Respiratory, Gastrointestinal, Genitourinary, Musculoskeletal, Integumentary, Neurological, Psychiatric, Endocrine, Heme/Lymph, and Allergic/Immunologic review of systems are otherwise negative except as noted in the HPI.    PAST MEDICAL & SURGICAL HISTORY:  Malignant neoplasm of lung, unspecified laterality, unspecified part of lung  Liver mass  Brain metastases  History of liver biopsy      FAMILY HISTORY:  No pertinent family history in first degree relatives      SOCIAL HISTORY:    Allergies    No Known Allergies    Intolerances        MEDICATIONS  (STANDING):  dexamethasone  Injectable 4 milliGRAM(s) IV Push every 6 hours  enoxaparin Injectable 40 milliGRAM(s) SubCutaneous daily  pantoprazole    Tablet 40 milliGRAM(s) Oral before breakfast    MEDICATIONS  (PRN):      Vital Signs Last 24 Hrs  T(C): 36.9 (18 May 2018 09:19), Max: 36.9 (17 May 2018 13:44)  T(F): 98.4 (18 May 2018 09:19), Max: 98.4 (17 May 2018 13:44)  HR: 76 (18 May 2018 09:19) (65 - 81)  BP: 105/62 (18 May 2018 09:19) (105/62 - 140/70)  BP(mean): --  RR: 18 (18 May 2018 09:19) (18 - 19)  SpO2: 97% (18 May 2018 09:19) (94% - 97%)    PHYSICAL EXAM:    GENERAL: NAD, well-groomed, well-developed  HEAD:  Atraumatic, Normocephalic  EYES: EOMI, PERRLA, conjunctiva and sclera clear  ENMT: No tonsillar erythema, exudates, or enlargement; Moist mucous membranes, Good dentition, No lesions  NECK: Supple, No JVD, Normal thyroid  NERVOUS SYSTEM:  Alert & Oriented X3, Good concentration; Motor Strength 5/5 B/L upper and lower extremities; DTRs 2+ intact and symmetric  CHEST/LUNG: Clear to auscultation bilaterally; No rales, rhonchi, wheezing, or rubs  HEART: Regular rate and rhythm; No murmurs, rubs, or gallops  ABDOMEN: Soft, Nontender, Nondistended; Bowel sounds present  EXTREMITIES:  2+ Peripheral Pulses, No clubbing, cyanosis, or edema  LYMPH: No lymphadenopathy noted  SKIN: No rashes or lesions      LABS:                        13.8   12.3  )-----------( 217      ( 17 May 2018 04:44 )             41.4     05-17    138  |  100  |  22.0<H>  ----------------------------<  132<H>  4.2   |  25.0  |  0.60    Ca    9.1      17 May 2018 04:44    TPro  6.5<L>  /  Alb  3.8  /  TBili  0.7  /  DBili  x   /  AST  15  /  ALT  26  /  AlkPhos  59  05-17    PT/INR - ( 17 May 2018 04:44 )   PT: 10.4 sec;   INR: 0.95 ratio         PTT - ( 17 May 2018 04:44 )  PTT:23.8 sec  Urinalysis Basic - ( 17 May 2018 13:06 )    Color: Yellow / Appearance: Clear / S.025 / pH: x  Gluc: x / Ketone: Negative  / Bili: Negative / Urobili: Negative mg/dL   Blood: x / Protein: 15 mg/dL / Nitrite: Negative   Leuk Esterase: Trace / RBC: x / WBC 3-5   Sq Epi: x / Non Sq Epi: Occasional / Bacteria: x          RADIOLOGY & ADDITIONAL STUDIES: REASON FOR CONSULTATION: brain mets, lung cancer    HPI:  68yoF with PMH 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 Case discussed @ length with son, pt is poor historian due to being aaox2. Per son, pt recently with onset of Rt sided weakness several weeks ago prompting eval in ER @ [facility] to r/o CVA. At that time noted to have mets to the brain and on futher exam seen to have lung mass suspected to be primary lesion with mets also involving the liver. Liver bx taken, results pending. Since discharge from [facility], pt managed @ home by son but noted to have worsening complications of altered personality increased paranoia and agitation, and gait instability. Has had approx 20 falls since dx. Last fall prompting presentation to the hospital at this time given trauma to the head. Cannot assess whether LOC was present or not. Pt has become an extreme risk at home because of agitation prompting her to constantly attempt to ambulate or climb out of bed alone and has required 24hr surveillance by family. (17 May 2018 08:32)      REVIEW OF SYSTEMS:  Constitutional, Eyes, ENT, Cardiovascular, Respiratory, Gastrointestinal, Genitourinary, Musculoskeletal, Integumentary, Neurological, Psychiatric, Endocrine, Heme/Lymph, and Allergic/Immunologic review of systems are otherwise negative except as noted in the HPI.    PAST MEDICAL & SURGICAL HISTORY:  Malignant neoplasm of lung, unspecified laterality, unspecified part of lung  Liver mass  Brain metastases  History of liver biopsy      FAMILY HISTORY:  No pertinent family history in first degree relatives      SOCIAL HISTORY:    Allergies    No Known Allergies    Intolerances        MEDICATIONS  (STANDING):  dexamethasone  Injectable 4 milliGRAM(s) IV Push every 6 hours  enoxaparin Injectable 40 milliGRAM(s) SubCutaneous daily  pantoprazole    Tablet 40 milliGRAM(s) Oral before breakfast    MEDICATIONS  (PRN):      Vital Signs Last 24 Hrs  T(C): 36.9 (18 May 2018 09:19), Max: 36.9 (17 May 2018 13:44)  T(F): 98.4 (18 May 2018 09:19), Max: 98.4 (17 May 2018 13:44)  HR: 76 (18 May 2018 09:19) (65 - 81)  BP: 105/62 (18 May 2018 09:19) (105/62 - 140/70)  BP(mean): --  RR: 18 (18 May 2018 09:19) (18 - 19)  SpO2: 97% (18 May 2018 09:19) (94% - 97%)    PHYSICAL EXAM:  Gen: awake alert, not oriented to time  Eyes: PERRL, EOMI, no conjunctival infection, anicteric.   Neck:. supple.   Pulmonary:. clear bilaterally.   Cardiac:. S1/S2.   Vascular:. No edema.   Abdomen:. soft, non tender, non disteded.   Lymphatic:. no cervical or supraclavicular adenopathy.   Skin: normal appearance, no rash, nodules, vesicles, ulcers, erythema.   Neurology:. right hemiparesis, UE>LE, short term memory deficit.   Psychiatric:. normal mood.       LABS:                        13.8   12.3  )-----------( 217      ( 17 May 2018 04:44 )             41.4         138  |  100  |  22.0<H>  ----------------------------<  132<H>  4.2   |  25.0  |  0.60    Ca    9.1      17 May 2018 04:44    TPro  6.5<L>  /  Alb  3.8  /  TBili  0.7  /  DBili  x   /  AST  15  /  ALT  26  /  AlkPhos  59      PT/INR - ( 17 May 2018 04:44 )   PT: 10.4 sec;   INR: 0.95 ratio         PTT - ( 17 May 2018 04:44 )  PTT:23.8 sec  Urinalysis Basic - ( 17 May 2018 13:06 )    Color: Yellow / Appearance: Clear / S.025 / pH: x  Gluc: x / Ketone: Negative  / Bili: Negative / Urobili: Negative mg/dL   Blood: x / Protein: 15 mg/dL / Nitrite: Negative   Leuk Esterase: Trace / RBC: x / WBC 3-5   Sq Epi: x / Non Sq Epi: Occasional / Bacteria: x    RADIOLOGY & ADDITIONAL STUDIES:

## 2018-05-18 NOTE — PROGRESS NOTE ADULT - ASSESSMENT
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     Metastatic Lung Ca- STABLE> suspected Small Cell. mets to brain + liver. regarding brain mets, noted to have mult supra + infratentorial brain mets containing blood products/debris w/ necrosis + Lt temporal horn effacement complicated by mass effect causing subfalcine herniation as per CTH. recently dx @ [facility] 1wk prior to admission. sp liver bx for confirmation @ [facility], pending result. complicated by severe incoordination, RUE weakness, cognitive impairment, + intermittent receptive aphasia. PLAN. decadon IV. radiation therapy to brain per Dr Mondragon, will need 10 total tx, to be transferred daily Mon - Fri, first session 517, well tolerated uncomplicated. Palliative consult appreciated, pending family meeting. neurosx consult appreciated, poor prognosis, no sx intervention offered @ this time. pt family cannot care for her needs @ this time given advanced disease with complications, likely transfer to [facility] or acute rehab, overall good candidate for hospice.    Gait instability - complicated by frequent falls. most recent fall just prior to admit to Rt frontoparietal head. likely sec to brain mets causing gait isntability. PT eval appreciated, rec quad cane w/ various dispo options including home w/ assist, [facility], home with 24/7 care, acute rehab. PLAN. PMR + OT consult. fall precautions.     Personalisty Change- case discussed with son, pt with inc agitation + uncooperation + onset of angry ep, per family this is significant change of personality. suspect sec to brain mets. PLAN. cont family counseling. redirection when needed. attempt non pharmacological means first as tolerated. consider psych consult PRN. cont 1:1 for impulsivity.    Leukocytosis- mild. no signs active infection. UA neg. most likely sec to malingnacy. PLAN. 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     YAHAIRA Metastatic Lung Ca- STABLE> recently dx weeks prior to presentation. initially suspected Small Cell but recent liver bx from [facility] showing high grade Ca w/ neuroendocrine features despite neuroendocrine serum markers being neg per Onc, pending sec opinion on path. noted to have mets to lymph nodes + brain + liver. regarding brain mets, noted to have mult supra + infratentorial brain mets containing blood products/debris w/ necrosis + Lt temporal horn effacement complicated by mass effect causing subfalcine herniation as per CTH. complicated by severe incoordination, RUE weakness, cognitive impairment, + intermittent receptive aphasia. PLAN. decadon IV. radiation therapy to brain per Dr Mondragon, will need 10 total tx, to be transferred daily Mon - Fri, first session 5/17, well tolerated uncomplicated. Palliative consult appreciated, pending family meeting. neurosx consult appreciated, poor prognosis, no sx intervention offered @ this time. Onc consult appreciated, will fu (seen by [facility]). pt family cannot care for her needs @ this time given advanced disease with complications, likely transfer to [facility] or acute rehab, overall good candidate for hospice. will need to clarify with [facility] if acute rehab accepts pt on radiation therapy.     Gait instability - complicated by frequent falls. most recent fall just prior to admit to Rt frontoparietal head. likely sec to brain mets causing gait isntability. PT eval appreciated, rec quad cane w/ various dispo options including home w/ assist, [facility], home with 24/7 care, acute rehab. PLAN. PMR + OT consult. fall precautions.     Personalisty Change- case discussed with son, pt with inc agitation + uncooperation + onset of angry ep, per family this is significant change of personality. suspect sec to brain mets. PLAN. cont family counseling. redirection when needed. attempt non pharmacological means first as tolerated. consider psych consult PRN. cont 1:1 for impulsivity.    Leukocytosis- mild. no signs active infection. UA neg. most likely sec to malingnacy. PLAN. no further intervention, no need to repeat cbc unless acute clinical need     DVT ppx- Lovenox

## 2018-05-18 NOTE — OCCUPATIONAL THERAPY INITIAL EVALUATION ADULT - RANGE OF MOTION EXAMINATION, UPPER EXTREMITY
right shoulder with no AROM, right elbow flexion AROM WFL however with limited elbow extension AROM, right hand flexed in gross grasp with increased tone and pt with no AROM digit extension/Left UE Active ROM was WFL (within functional limits)/Right UE Passive ROM was WFL  (within functional limits)

## 2018-05-18 NOTE — PROGRESS NOTE ADULT - ASSESSMENT
68 year old with YAHAIRA mass with mets lymph nodes, brain, and liver. She's s/p liver biopsy at [facility].She has multiple brain mets with resultant right hemiparesis, short term memory deficit.   Liver biopsy results from [facility]: Metastatic high grade carcinoma morphologically suggestive of high grade carcinoma w/ neuroendocrine features however neuroendocrine markers are negative.  She's currently admitted s/p fall.  CT head with multiple brain metastases    -pathology from [facility] has been sent for a second opinion  - 68 year old with YAHAIRA mass with mets lymph nodes, brain, and liver. She's s/p liver biopsy at [facility].She has multiple brain mets with resultant right hemiparesis, short term memory deficit.   Liver biopsy results from [facility]: Metastatic high grade carcinoma morphologically suggestive of high grade carcinoma w/ neuroendocrine features however neuroendocrine markers are negative.  She's currently admitted s/p fall.  CT head with multiple brain metastases.    -pathology from [facility] has been sent for a second opinion  - 68 year old with YAHAIRA mass with mets lymph nodes, brain, and liver. She's s/p liver biopsy at [facility].She has multiple brain mets with resultant right hemiparesis, short term memory deficit.   Liver biopsy results from [facility]: Metastatic high grade carcinoma morphologically suggestive of high grade carcinoma w/ neuroendocrine features however neuroendocrine markers are negative.  She's currently admitted s/p fall.  CT head with multiple brain metastases.    -pathology from [facility] has been sent for a second opinion  -she began RT yesterday, agree with continuing at this time  -I had a discussion with son Jimenez over the phone.  Discussed aggressive nature of her stage IV disease, and that ultimately her QOL will be limited by her cranial metastases, and it's unclear how much function she will recover even post RT. He and his sister are overwhelmed with taking care of her at home and she needs constant supervision.  I discussed best supportive care was also an option with hospice services.  He will think over our discussion.

## 2018-05-18 NOTE — OCCUPATIONAL THERAPY INITIAL EVALUATION ADULT - PHYSICAL ASSIST/NONPHYSICAL ASSIST:TOILET, OT EVAL
2 person assist/+urination during evaluation; one person to maintain pt balance and 2nd person to hygiene/manage pants (CNA assist with toileting task)

## 2018-05-18 NOTE — PROGRESS NOTE ADULT - SUBJECTIVE AND OBJECTIVE BOX
CC:  follow up symptoms and GOC  INTERVAL HPI/OVERNIGHT EVENTS:  no overnight events  on 1:1  PRESENT SYMPTOMS: SOURCE:  Patient/Family/Team    PAIN SCALE:  0 = none  1 = mild   2 = moderate  3 = severe    Pain: denies    Dyspnea:  [ ] YES [ x] NO  Anxiety:  [ ] YES x[ ] NO  Fatigue: [x ] YES [ ] NO  Nausea: [ ] YES [x ] NO  Loss of Appetite: [x ] YES [ ] NO  Other symptoms: __________    MEDICATIONS  (STANDING):  dexamethasone  Injectable 4 milliGRAM(s) IV Push every 6 hours  enoxaparin Injectable 40 milliGRAM(s) SubCutaneous daily  pantoprazole    Tablet 40 milliGRAM(s) Oral before breakfast    MEDICATIONS  (PRN):      Allergies    No Known Allergies    Intolerances      Karnofsky Performance Score/Palliative Performance Status Version 2:  30       %    Vital Signs Last 24 Hrs  T(C): 36.8 (18 May 2018 10:28), Max: 36.9 (18 May 2018 07:20)  T(F): 98.2 (18 May 2018 10:28), Max: 98.4 (18 May 2018 07:20)  HR: 78 (18 May 2018 10:28) (65 - 80)  BP: 112/64 (18 May 2018 10:28) (105/62 - 140/70)  BP(mean): --  RR: 18 (18 May 2018 10:28) (18 - 19)  SpO2: 96% (18 May 2018 10:28) (94% - 97%)    PHYSICAL EXAM:    General:  Awake, AOx1, NAD confused    HEENT: [x ] normal  [ ] dry mouth  [ ] ET tube/trach    Lungs: [x ] comfortable [ ] tachypnea/labored breathing  [ ] excessive secretions    CV: [ x] normal  [ ] tachycardia    GI: [ x] normal  [ ] distended  [ ] tender  [ ] no BS               [ ] PEG/NG/OG tube    : [x ] normal  [ ] incontinent  [ ] oliguria/anuria  [ ] hudson    MSK: [ ] normal  [x ] weakness  [ ] edema             [ ] ambulatory  [ ] bedbound/wheelchair bound    Skin: [ ] normal  [ ] pressure ulcers- Stage_____  [ x] no rash    LABS:                        13.8   12.3  )-----------( 217      ( 17 May 2018 04:44 )             41.4         138  |  100  |  22.0<H>  ----------------------------<  132<H>  4.2   |  25.0  |  0.60    Ca    9.1      17 May 2018 04:44    TPro  6.5<L>  /  Alb  3.8  /  TBili  0.7  /  DBili  x   /  AST  15  /  ALT  26  /  AlkPhos  59      PT/INR - ( 17 May 2018 04:44 )   PT: 10.4 sec;   INR: 0.95 ratio         PTT - ( 17 May 2018 04:44 )  PTT:23.8 sec  Urinalysis Basic - ( 17 May 2018 13:06 )    Color: Yellow / Appearance: Clear / S.025 / pH: x  Gluc: x / Ketone: Negative  / Bili: Negative / Urobili: Negative mg/dL   Blood: x / Protein: 15 mg/dL / Nitrite: Negative   Leuk Esterase: Trace / RBC: x / WBC 3-5   Sq Epi: x / Non Sq Epi: Occasional / Bacteria: x      I&O's Summary    Thank you for the opportunity to assist with the care of this patient.   [facility] [phone].

## 2018-05-18 NOTE — OCCUPATIONAL THERAPY INITIAL EVALUATION ADULT - ADDITIONAL COMMENTS
Pt lives in private home with daughter with 4 ODILIA and 12 steps up to bedroom/bathroom. Bathroom has tub with curtains. Pt does not have any DME. Pt is right handed. Information should be verified for accuracy as pt with difficulty providing information. Pt lives in private home with daughter with 4 ODILIA and 12 steps up to bedroom/bathroom. Bathroom has tub with curtains. Pt does not have any DME. Pt is right handed. Pt states she was independent prior to admission. Information should be verified for accuracy as pt with difficulty providing information and is poor historian.

## 2018-05-18 NOTE — CONSULT NOTE ADULT - SUBJECTIVE AND OBJECTIVE BOX
CC: Right sided weakness    HPI:  68F PMHx stage IV lung cancer, with recently diagnosed mets to liver and brain presented to [facility]  s/p fall. Patient is confused and poor historian. Per chart, patient fell at home approximately 1 week ago and was noted to have right sided weakness. She was evaluated at [facility] and diagnosed with mets to the liver and brain. She was discharged home with her family and over the last week has continued to be confused, impulsive, and had trouble walking with frequent falls, prompting this hospital admission. Head CT on admission showed multiple supra and infratentorial metastatic lesions, with a left temporal lesion causing midline shift. She was evaluated by neurosurgery and no surgical intervention was recommended due to poor prognosis. She was started on decadron and radiation therapy has been initiated. Palliative care is also following the case. Patient is on 1:1 supervision due to impulsivity. Per 1:1 aide at bedside, patient requires assist to bathroom and is unsteady on her feet, frequently trying to climb out of bed.      REVIEW OF SYSTEMS  Constitutional - No fever, No fatigue  HEENT - No visual disturbances, No difficulty hearing,  No neck pain  Respiratory - No cough, No shortness of breath  Cardiovascular - No chest pain, No palpitations  Gastrointestinal - No abdominal pain, No nausea, No vomiting, No diarrhea, No constipation  Genitourinary - No dysuria, No frequency, No hematuria, No incontinence  Neurological - No headaches, No loss of strength, No numbness  Skin - No itching, No rashes  Endocrine - No temperature intolerance  Musculoskeletal - No joint pain, No joint swelling, No muscle pain  Psychiatric - No depression, No anxiety  All other review of systems negative    PAST MEDICAL & SURGICAL HISTORY  Stage IV lung cancer - mets to brain and liver  No significant past surgical history      SOCIAL HISTORY  History of smoking  EtOH - Denied   Drugs - Denied    FUNCTIONAL HISTORY  Right hand dominant  Per chart, patient lives with son. Patient confused, but reports she lives with daughter in private house with 4-5 ODILIA, 1 flight inside  Was requiring assist at home for ambulation and ADLs due to impulsivity and confusion, frequent falls  Retired, formerly worked making cigarettes      CURRENT FUNCTIONAL STATUS  Bed mobility -   Transfers -   Gait -   ADL's -    FAMILY HISTORY   Reviewed and non-contributory    ALLERGIES  No Known Allergies      VITALS  T(C): 36.9 (18 @ 07:20)  T(F): 98.4 (18 @ 07:20), Max: 98.4 (18 @ 13:44)  HR: 70 (18 @ 07:20) (65 - 81)  BP: 110/62 (18 @ 07:20) (109/60 - 140/70)  RR:  (18 - 19)  SpO2:  (94% - 97%)  Wt(kg): --    PHYSICAL EXAM  Constitutional - NAD, Comfortable  HEENT - NCAT, EOMI  Neck - Supple  Chest - CTA bilaterally  Cardiovascular - RRR, S1S2  Abdomen - BS+, Soft, NTND  Extremities - No C/C/E, No calf tenderness   Neurologic Exam -                    Cognitive - Awake, Alert, AAO to self, place, date, year, situation     Communication - Fluent, No dysarthria     Attention - Intact, able to recite days of week backwards     Memory - Intact, able to recall 3/3 items after 3 minutes     Cranial Nerves - CN 2-12 grossly intact     Motor -                     LEFT    UE - ShAB 5/5, EF 5/5, EE 5/5, WE 5/5,  5/5                    RIGHT UE - ShAB 5/5, EF 5/5, EE 5/5, WE 5/5,  5/5                    LEFT    LE - HF 5/5, KE 5/5, DF 5/5, PF 5/5                    RIGHT LE - HF 5/5, KE 5/5, DF 5/5, PF 5/5        Sensory - Intact to light touch     Reflexes - DTR intact and symmetrical, Negative Schroeder's bilaterally, Negative Babinski's bilaterally      Coordination - Finger-to-nose intact bilaterally   Psychiatric - Affect WNL      RECENT LABS/IMAGING                        13.8   12.3  )-----------( 217      ( 17 May 2018 04:44 )             41.4     -    138  |  100  |  22.0<H>  ----------------------------<  132<H>  4.2   |  25.0  |  0.60    Ca    9.1      17 May 2018 04:44    TPro  6.5<L>  /  Alb  3.8  /  TBili  0.7  /  DBili  x   /  AST  15  /  ALT  26  /  AlkPhos  59  -    PT/INR - ( 17 May 2018 04:44 )   PT: 10.4 sec;   INR: 0.95 ratio         PTT - ( 17 May 2018 04:44 )  PTT:23.8 sec  Urinalysis Basic - ( 17 May 2018 13:06 )    Color: Yellow / Appearance: Clear / S.025 / pH: x  Gluc: x / Ketone: Negative  / Bili: Negative / Urobili: Negative mg/dL   Blood: x / Protein: 15 mg/dL / Nitrite: Negative   Leuk Esterase: Trace / RBC: x / WBC 3-5   Sq Epi: x / Non Sq Epi: Occasional / Bacteria: x      EXAM:  CT BRAIN                          PROCEDURE DATE:  2018      INTERPRETATION:  Clinical indication: History of brain metastasis,   increased falls.    Technique: CT axial images of the head were obtained without intravenous   contrast. Computer-reconstructed coronal and sagittal images were   obtained.    Comparison:  None.    Findings: There are multiple supratentorial and infratentorial metastatic   lesions with varying attenuation and vasogenic edema, for example, 2.8 x   2.1 cm in the left medial temporal lobe (2:18), 1.1 x 1.3 cm splenium of   the left corpus callosum (2:28), 2.5 x 1.9 cm and 0.8 x 0.8 cm in the   right cerebellum (2:15), 0.7 x 0.7 cm in the right lateral cerebellum   (2:14), 1.3 x 1.1 cm (2:35) and 1.5 x 1.4 cm in the left centrum   semiovale (2:32), 0.7 x 0.7 cm in the right frontal region (2:17), 0.6 x   0.5 cm at the right frontal vertex (4:32) and 1.0 x 0.8 cm in the right   anterior frontal region (2:39). Vasogenic edema is also noted in the left   cerebellum. Some of the metastatic lesions demonstrate increased   attenuation, which may contain blood products/proteinaceous debris. Some   of the metastatic lesions, for example, in the left centrum semiovale,   left medial temporal lobe, right anterior frontal lobe and right   cerebellum demonstrate central hyperattenuation, indicating necrosis. The   left temporal metastatic lesion resultant mass effect with effacement of   the left temporal horn and a 0.3 cm rightward midline shift, indicating   subfalcine herniation. There is also mild mass effect on the left frontal   horn by the necrotic left centrum semiovale lesion.     Diffuse periventricular and subcortical white matter lucencies may   related to chronic microvascular ischemic changes and/or vasogenic edema.   Small lucencies in the feroz may be related to artifact or   age-indeterminate infarcts although additional metastasis cannot be   excluded. There is mild cerebral volume loss with commensurate   ventricular dilatation.    There is no depressed skull fracture. There is minimal mucosal thickening   of the sinuses. The tympanomastoid region is clear.      Impression:     Multiple supratentorial and infratentorial metastatic lesions with some   lesions containing blood products/debris and necrosis as described. The   left temporal metastatic lesion resultant mass effect with effacement of   the left temporal horn and a 0.3 cm rightward midline shift, indicating   subfalcine herniation.       MEDICATIONS   MEDICATIONS  (STANDING):  dexamethasone  Injectable 4 milliGRAM(s) IV Push every 6 hours  enoxaparin Injectable 40 milliGRAM(s) SubCutaneous daily  pantoprazole    Tablet 40 milliGRAM(s) Oral before breakfast        ASSESSMENT/PLAN  68F with metastatic lung cancer (mets to liver and brain) with right hemiparesis, cognitive deficits, functional, gait, ADL impairments.    Disposition -  Brain mets - Decadron 4mg Q6hrs, Radiation per Rad/Onc  Impulsivity - 1:1 supervision  PT - ROM, Bed mobility, Transfers, Ambulation with assistive device  OT - ADLs, ROM, Recommend resting hand splint to right UE  SLP - Cognitive eval  Precautions - Falls  DVT Prophylaxis - Lovenox  Weight bearing status - Full  Skin - Turn Q2hrs  Diet - Regular CC: Right sided weakness    HPI:  68F PMHx stage IV lung cancer, with recently diagnosed mets to liver and brain presented to [facility]  s/p fall. Patient is confused and poor historian. Per chart, patient fell at home approximately 1 week ago and was noted to have right sided weakness. She was evaluated at [facility] and diagnosed with mets to the liver and brain. She was discharged home with her family and over the last week has continued to be confused, impulsive, and had trouble walking with frequent falls, prompting this hospital admission. Head CT on admission showed multiple supra and infratentorial metastatic lesions, with a left temporal lesion causing midline shift. She was evaluated by neurosurgery and no surgical intervention was recommended due to poor prognosis. She was started on decadron and radiation therapy has been initiated. Palliative care is also following the case. Patient is on 1:1 supervision due to impulsivity. Per 1:1 aide at bedside, patient requires assist to bathroom and is unsteady on her feet, frequently trying to climb out of bed.      REVIEW OF SYSTEMS  Constitutional - No fever, No fatigue  HEENT - No visual disturbances, No difficulty hearing,  No neck pain  Respiratory - No cough, No shortness of breath  Cardiovascular - No chest pain, No palpitations  Gastrointestinal - No abdominal pain, No nausea, No vomiting, No diarrhea, No constipation  Genitourinary - No dysuria, No frequency, No incontinence  Neurological - +Right sided weakness, trouble walking  Skin - No itching, No rashes  Musculoskeletal - No joint pain, No joint swelling, No muscle pain  Psychiatric - No depression, No anxiety  All other review of systems negative    PAST MEDICAL & SURGICAL HISTORY  Stage IV lung cancer - mets to brain and liver  No significant past surgical history      SOCIAL HISTORY  History of smoking  EtOH - Denied   Drugs - Denied    FUNCTIONAL HISTORY  Right hand dominant  Per chart, patient lives with son. Patient confused, but reports she lives with daughter in private house with 4-5 ODILIA, 1 flight inside  Was requiring assist at home for ambulation and ADLs due to impulsivity and confusion, frequent falls  Retired, formerly worked making cigarettes    CURRENT FUNCTIONAL STATUS  Bed mobility - min assist  Transfers - min assist  Gait - Amb 10' hand held assist    FAMILY HISTORY   Reviewed and non-contributory    ALLERGIES  No Known Allergies      VITALS  T(C): 36.9 (18 @ 07:20)  T(F): 98.4 (18 @ 07:20), Max: 98.4 (18 @ 13:44)  HR: 70 (18 @ 07:20) (65 - 81)  BP: 110/62 (18 @ 07:20) (109/60 - 140/70)  RR:  (18 - 19)  SpO2:  (94% - 97%)  Wt(kg): --    PHYSICAL EXAM  Constitutional - NAD, Comfortable  HEENT - NCAT, EOMI  Neck - Supple  Chest - CTA bilaterally  Cardiovascular - RRR, S1S2  Abdomen - BS+, Soft, NTND  Extremities - No C/C/E, No calf tenderness   Neurologic Exam -                    Cognitive - Awake, Alert, AAO to self, place, date, year, situation     Communication - Fluent, No dysarthria     Attention - Intact, able to recite days of week backwards     Memory - Intact, able to recall 3/3 items after 3 minutes     Cranial Nerves - CN 2-12 grossly intact     Motor -                     LEFT    UE - ShAB 5/5, EF 5/5, EE 5/5, WE 5/5,  5/5                    RIGHT UE - ShAB 5/5, EF 5/5, EE 5/5, WE 5/5,  5/5                    LEFT    LE - HF 5/5, KE 5/5, DF 5/5, PF 5/5                    RIGHT LE - HF 5/5, KE 5/5, DF 5/5, PF 5/5        Sensory - Intact to light touch     Reflexes - DTR intact and symmetrical, Negative Schroeder's bilaterally, Negative Babinski's bilaterally      Coordination - Finger-to-nose intact bilaterally   Psychiatric - Affect WNL      RECENT LABS/IMAGING                        13.8   12.3  )-----------( 217      ( 17 May 2018 04:44 )             41.4         138  |  100  |  22.0<H>  ----------------------------<  132<H>  4.2   |  25.0  |  0.60    Ca    9.1      17 May 2018 04:44    TPro  6.5<L>  /  Alb  3.8  /  TBili  0.7  /  DBili  x   /  AST  15  /  ALT  26  /  AlkPhos  59      PT/INR - ( 17 May 2018 04:44 )   PT: 10.4 sec;   INR: 0.95 ratio         PTT - ( 17 May 2018 04:44 )  PTT:23.8 sec  Urinalysis Basic - ( 17 May 2018 13:06 )    Color: Yellow / Appearance: Clear / S.025 / pH: x  Gluc: x / Ketone: Negative  / Bili: Negative / Urobili: Negative mg/dL   Blood: x / Protein: 15 mg/dL / Nitrite: Negative   Leuk Esterase: Trace / RBC: x / WBC 3-5   Sq Epi: x / Non Sq Epi: Occasional / Bacteria: x      EXAM:  CT BRAIN                          PROCEDURE DATE:  2018      INTERPRETATION:  Clinical indication: History of brain metastasis,   increased falls.    Technique: CT axial images of the head were obtained without intravenous   contrast. Computer-reconstructed coronal and sagittal images were   obtained.    Comparison:  None.    Findings: There are multiple supratentorial and infratentorial metastatic   lesions with varying attenuation and vasogenic edema, for example, 2.8 x   2.1 cm in the left medial temporal lobe (2:18), 1.1 x 1.3 cm splenium of   the left corpus callosum (2:28), 2.5 x 1.9 cm and 0.8 x 0.8 cm in the   right cerebellum (2:15), 0.7 x 0.7 cm in the right lateral cerebellum   (2:14), 1.3 x 1.1 cm (2:35) and 1.5 x 1.4 cm in the left centrum   semiovale (2:32), 0.7 x 0.7 cm in the right frontal region (2:17), 0.6 x   0.5 cm at the right frontal vertex (4:32) and 1.0 x 0.8 cm in the right   anterior frontal region (2:39). Vasogenic edema is also noted in the left   cerebellum. Some of the metastatic lesions demonstrate increased   attenuation, which may contain blood products/proteinaceous debris. Some   of the metastatic lesions, for example, in the left centrum semiovale,   left medial temporal lobe, right anterior frontal lobe and right   cerebellum demonstrate central hyperattenuation, indicating necrosis. The   left temporal metastatic lesion resultant mass effect with effacement of   the left temporal horn and a 0.3 cm rightward midline shift, indicating   subfalcine herniation. There is also mild mass effect on the left frontal   horn by the necrotic left centrum semiovale lesion.     Diffuse periventricular and subcortical white matter lucencies may   related to chronic microvascular ischemic changes and/or vasogenic edema.   Small lucencies in the feroz may be related to artifact or   age-indeterminate infarcts although additional metastasis cannot be   excluded. There is mild cerebral volume loss with commensurate   ventricular dilatation.    There is no depressed skull fracture. There is minimal mucosal thickening   of the sinuses. The tympanomastoid region is clear.      Impression:     Multiple supratentorial and infratentorial metastatic lesions with some   lesions containing blood products/debris and necrosis as described. The   left temporal metastatic lesion resultant mass effect with effacement of   the left temporal horn and a 0.3 cm rightward midline shift, indicating   subfalcine herniation.       MEDICATIONS   MEDICATIONS  (STANDING):  dexamethasone  Injectable 4 milliGRAM(s) IV Push every 6 hours  enoxaparin Injectable 40 milliGRAM(s) SubCutaneous daily  pantoprazole    Tablet 40 milliGRAM(s) Oral before breakfast        ASSESSMENT/PLAN  68F with metastatic lung cancer (mets to liver and brain) with right hemiparesis, cognitive deficits, functional, gait, ADL impairments.    Disposition -  Brain mets - Decadron 4mg Q6hrs, Radiation per Rad/Onc  Impulsivity - 1:1 supervision  PT - ROM, Bed mobility, Transfers, Ambulation with assistive device  OT - ADLs, ROM, Recommend resting hand splint to right UE  SLP - Cognitive eval  Precautions - Falls  DVT Prophylaxis - Lovenox  Weight bearing status - Full  Skin - Turn Q2hrs  Diet - Regular CC: Right sided weakness    HPI:  68F PMHx stage IV lung cancer, with recently diagnosed mets to liver and brain presented to [facility]  s/p fall. Patient is confused and poor historian. Per chart, patient fell at home approximately 1 week ago and was noted to have right sided weakness. She was evaluated at [facility] and diagnosed with mets to the liver and brain. She was discharged home with her family and over the last week has continued to be confused, impulsive, and had trouble walking with frequent falls, prompting this hospital admission. Head CT on admission showed multiple supra and infratentorial metastatic lesions, with a left temporal lesion causing midline shift. She was evaluated by neurosurgery and no surgical intervention was recommended due to poor prognosis. She was started on decadron and radiation therapy has been initiated. Palliative care is also following the case. Patient is on 1:1 supervision due to impulsivity. Per 1:1 aide at bedside, patient requires assist to bathroom and is unsteady on her feet, frequently trying to climb out of bed.      REVIEW OF SYSTEMS - Limited secondary to cognition  Constitutional - No fever, No fatigue  HEENT - No visual disturbances, No difficulty hearing,  No neck pain  Respiratory - No cough, No shortness of breath  Cardiovascular - No chest pain, No palpitations  Gastrointestinal - No abdominal pain, No nausea, No vomiting, No diarrhea, No constipation  Genitourinary - No dysuria, No frequency, No incontinence  Neurological - +Right sided weakness, trouble walking  Musculoskeletal - No joint pain, No joint swelling, No muscle pain  Psychiatric - No depression, No anxiety  All other review of systems negative    PAST MEDICAL & SURGICAL HISTORY  Stage IV lung cancer - mets to brain and liver  No significant past surgical history      SOCIAL HISTORY  History of smoking  EtOH - Denied   Drugs - Denied    FUNCTIONAL HISTORY  Right hand dominant  Per chart, patient lives with son. Patient confused, but reports she lives with daughter in private house with 4-5 ODILIA, 1 flight inside  Was requiring assist at home for ambulation and ADLs due to impulsivity and confusion, frequent falls  Retired, formerly worked making cigarettes    CURRENT FUNCTIONAL STATUS  Bed mobility - min assist  Transfers - min assist  Gait - Amb 10' hand held assist    FAMILY HISTORY   Reviewed and non-contributory    ALLERGIES  No Known Allergies      VITALS  T(C): 36.9 (18 May 2018 09:19), Max: 36.9 (17 May 2018 13:44)  T(F): 98.4 (18 May 2018 09:19), Max: 98.4 (17 May 2018 13:44)  HR: 76 (18 May 2018 09:19) (65 - 81)  BP: 105/62 (18 May 2018 09:19) (105/62 - 140/70)  RR: 18 (18 May 2018 09:19) (18 - 19)  SpO2: 97% (18 May 2018 09:19) (94% - 97%)      PHYSICAL EXAM  Constitutional - NAD, Comfortable  HEENT - NCAT, EOMI  Neck - Supple  Chest - CTA bilaterally  Cardiovascular - RRR, S1S2  Abdomen - BS+, Soft, NTND  Extremities - Edema right LE, No calf tenderness   Neurologic Exam -                    Cognitive - Awake, Alert, AAO to self only. Requires assist for place, month, year and situation     Communication - Fluent, No dysarthria     Attention - Able to recite days of week forward, follows multistep commands     Memory - Recalls 0/3 words without assist, 1/3 with cues     Cranial Nerves - CN 2-12 grossly intact     Motor -                     LEFT    UE - ShAB 5/5, EF 5/5, EE 5/5, WE 5/5,  5/5                    RIGHT UE - ShAB 1/5, EF 2/5, EE 2/5, increased tone in right hand and wrist                    LEFT    LE - HF 5/5, KE 5/5, DF 5/5, PF 5/5                    RIGHT LE - HF 4/5, KE 4/5, DF 4/5, PF 4/5        Sensory - Intact to light touch     Reflexes - DTR brisk in bilateral UE  Psychiatric - Affect WNL      RECENT LABS/IMAGING                        13.8   12.3  )-----------( 217      ( 17 May 2018 04:44 )             41.4     05-    138  |  100  |  22.0<H>  ----------------------------<  132<H>  4.2   |  25.0  |  0.60    Ca    9.1      17 May 2018 04:44    TPro  6.5<L>  /  Alb  3.8  /  TBili  0.7  /  DBili  x   /  AST  15  /  ALT  26  /  AlkPhos  59  05-17    PT/INR - ( 17 May 2018 04:44 )   PT: 10.4 sec;   INR: 0.95 ratio         PTT - ( 17 May 2018 04:44 )  PTT:23.8 sec  Urinalysis Basic - ( 17 May 2018 13:06 )    Color: Yellow / Appearance: Clear / S.025 / pH: x  Gluc: x / Ketone: Negative  / Bili: Negative / Urobili: Negative mg/dL   Blood: x / Protein: 15 mg/dL / Nitrite: Negative   Leuk Esterase: Trace / RBC: x / WBC 3-5   Sq Epi: x / Non Sq Epi: Occasional / Bacteria: x      EXAM:  CT BRAIN                          PROCEDURE DATE:  2018      INTERPRETATION:  Clinical indication: History of brain metastasis,   increased falls.    Technique: CT axial images of the head were obtained without intravenous   contrast. Computer-reconstructed coronal and sagittal images were   obtained.    Comparison:  None.    Findings: There are multiple supratentorial and infratentorial metastatic   lesions with varying attenuation and vasogenic edema, for example, 2.8 x   2.1 cm in the left medial temporal lobe (2:18), 1.1 x 1.3 cm splenium of   the left corpus callosum (2:28), 2.5 x 1.9 cm and 0.8 x 0.8 cm in the   right cerebellum (2:15), 0.7 x 0.7 cm in the right lateral cerebellum   (2:14), 1.3 x 1.1 cm (2:35) and 1.5 x 1.4 cm in the left centrum   semiovale (2:32), 0.7 x 0.7 cm in the right frontal region (2:17), 0.6 x   0.5 cm at the right frontal vertex (4:32) and 1.0 x 0.8 cm in the right   anterior frontal region (2:39). Vasogenic edema is also noted in the left   cerebellum. Some of the metastatic lesions demonstrate increased   attenuation, which may contain blood products/proteinaceous debris. Some   of the metastatic lesions, for example, in the left centrum semiovale,   left medial temporal lobe, right anterior frontal lobe and right   cerebellum demonstrate central hyperattenuation, indicating necrosis. The   left temporal metastatic lesion resultant mass effect with effacement of   the left temporal horn and a 0.3 cm rightward midline shift, indicating   subfalcine herniation. There is also mild mass effect on the left frontal   horn by the necrotic left centrum semiovale lesion.     Diffuse periventricular and subcortical white matter lucencies may   related to chronic microvascular ischemic changes and/or vasogenic edema.   Small lucencies in the feroz may be related to artifact or   age-indeterminate infarcts although additional metastasis cannot be   excluded. There is mild cerebral volume loss with commensurate   ventricular dilatation.    There is no depressed skull fracture. There is minimal mucosal thickening   of the sinuses. The tympanomastoid region is clear.      Impression:     Multiple supratentorial and infratentorial metastatic lesions with some   lesions containing blood products/debris and necrosis as described. The   left temporal metastatic lesion resultant mass effect with effacement of   the left temporal horn and a 0.3 cm rightward midline shift, indicating   subfalcine herniation.       MEDICATIONS   MEDICATIONS  (STANDING):  dexamethasone  Injectable 4 milliGRAM(s) IV Push every 6 hours  enoxaparin Injectable 40 milliGRAM(s) SubCutaneous daily  pantoprazole    Tablet 40 milliGRAM(s) Oral before breakfast        ASSESSMENT/PLAN  68F with metastatic lung cancer (mets to liver and brain) with right hemiparesis, cognitive deficits, functional, gait, ADL impairments.    Disposition -  Brain mets - Decadron 4mg Q6hrs, Radiation per Rad/Onc  Impulsivity - 1:1 supervision  PT - ROM, Bed mobility, Transfers, Ambulation with assistive device  OT - ADLs, ROM, Recommend resting hand splint to right UE  SLP - Cognitive eval  Precautions - Falls  DVT Prophylaxis - Lovenox  Weight bearing status - Full  Skin - Turn Q2hrs  Diet - Regular CC: Right sided weakness    HPI:  68F PMHx stage IV lung cancer, with recently diagnosed mets to liver and brain presented to [facility]  s/p fall. Patient is confused and poor historian. Per chart, patient fell at home approximately 1 week ago and was noted to have right sided weakness. She was evaluated at [facility] and diagnosed with mets to the liver and brain. She was discharged home with her family and over the last week has continued to be confused, impulsive, and had trouble walking with frequent falls, prompting this hospital admission. Head CT on admission showed multiple supra and infratentorial metastatic lesions, with a left temporal lesion causing midline shift. She was evaluated by neurosurgery and no surgical intervention was recommended due to poor prognosis. She was started on decadron and radiation therapy has been initiated. Palliative care is also following the case. Patient is on 1:1 supervision due to impulsivity. Per 1:1 aide at bedside, patient requires assist to bathroom and is unsteady on her feet, frequently trying to climb out of bed.      REVIEW OF SYSTEMS - Limited secondary to cognition  Constitutional - No fever, No fatigue  HEENT - No visual disturbances, No difficulty hearing,  No neck pain  Respiratory - No cough, No shortness of breath  Cardiovascular - No chest pain, No palpitations  Gastrointestinal - No abdominal pain, No nausea, No vomiting, No diarrhea, No constipation  Genitourinary - No dysuria, No frequency, No incontinence  Neurological - +Right sided weakness, trouble walking  Musculoskeletal - No joint pain, No joint swelling, No muscle pain  Psychiatric - No depression, No anxiety  All other review of systems negative    PAST MEDICAL & SURGICAL HISTORY  Stage IV lung cancer - mets to brain and liver  No significant past surgical history      SOCIAL HISTORY  History of smoking  EtOH - Denied   Drugs - Denied    FUNCTIONAL HISTORY  Right hand dominant  Per chart, patient lives with son. Patient confused, but reports she lives with daughter in private house with 4-5 ODILIA, 1 flight inside  Was requiring assist at home for ambulation and ADLs due to impulsivity and confusion, frequent falls  Retired, formerly worked making cigarettes    CURRENT FUNCTIONAL STATUS  Bed mobility - min assist  Transfers - min assist  Gait - Amb 10' hand held assist    FAMILY HISTORY   Reviewed and non-contributory    ALLERGIES  No Known Allergies      VITALS  T(C): 36.9 (18 May 2018 09:19), Max: 36.9 (17 May 2018 13:44)  T(F): 98.4 (18 May 2018 09:19), Max: 98.4 (17 May 2018 13:44)  HR: 76 (18 May 2018 09:19) (65 - 81)  BP: 105/62 (18 May 2018 09:19) (105/62 - 140/70)  RR: 18 (18 May 2018 09:19) (18 - 19)  SpO2: 97% (18 May 2018 09:19) (94% - 97%)      PHYSICAL EXAM  Constitutional - NAD, Comfortable  HEENT - NCAT, EOMI  Neck - Supple  Chest - CTA bilaterally  Cardiovascular - RRR, S1S2  Abdomen - BS+, Soft, NTND  Extremities - Edema right LE, No calf tenderness   Neurologic Exam -                    Cognitive - Awake, Alert, AAO to self only. Requires assist for place, month, year and situation     Communication - Fluent, No dysarthria, naming and repetition intact     Attention - Able to recite days of week forward, follows multistep commands     Memory - Recalls 0/3 words without assist, 1/3 with cues     Cranial Nerves - CN 2-12 grossly intact     Motor -                     LEFT    UE - ShAB 5/5, EF 5/5, EE 5/5, WE 5/5,  5/5                    RIGHT UE - ShAB 1/5, EF 2/5, EE 2/5, increased tone in right hand and wrist                    LEFT    LE - HF 5/5, KE 5/5, DF 5/5, PF 5/5                    RIGHT LE - HF 4/5, KE 4/5, DF 4/5, PF 4/5        Sensory - Intact to light touch     Reflexes - DTR brisk in bilateral UE  Psychiatric - Affect WNL      RECENT LABS/IMAGING                        13.8   12.3  )-----------( 217      ( 17 May 2018 04:44 )             41.4     05-17    138  |  100  |  22.0<H>  ----------------------------<  132<H>  4.2   |  25.0  |  0.60    Ca    9.1      17 May 2018 04:44    TPro  6.5<L>  /  Alb  3.8  /  TBili  0.7  /  DBili  x   /  AST  15  /  ALT  26  /  AlkPhos  59  05-17    PT/INR - ( 17 May 2018 04:44 )   PT: 10.4 sec;   INR: 0.95 ratio         PTT - ( 17 May 2018 04:44 )  PTT:23.8 sec  Urinalysis Basic - ( 17 May 2018 13:06 )    Color: Yellow / Appearance: Clear / S.025 / pH: x  Gluc: x / Ketone: Negative  / Bili: Negative / Urobili: Negative mg/dL   Blood: x / Protein: 15 mg/dL / Nitrite: Negative   Leuk Esterase: Trace / RBC: x / WBC 3-5   Sq Epi: x / Non Sq Epi: Occasional / Bacteria: x      EXAM:  CT BRAIN                          PROCEDURE DATE:  2018      INTERPRETATION:  Clinical indication: History of brain metastasis,   increased falls.    Technique: CT axial images of the head were obtained without intravenous   contrast. Computer-reconstructed coronal and sagittal images were   obtained.    Comparison:  None.    Findings: There are multiple supratentorial and infratentorial metastatic   lesions with varying attenuation and vasogenic edema, for example, 2.8 x   2.1 cm in the left medial temporal lobe (2:18), 1.1 x 1.3 cm splenium of   the left corpus callosum (2:28), 2.5 x 1.9 cm and 0.8 x 0.8 cm in the   right cerebellum (2:15), 0.7 x 0.7 cm in the right lateral cerebellum   (2:14), 1.3 x 1.1 cm (2:35) and 1.5 x 1.4 cm in the left centrum   semiovale (2:32), 0.7 x 0.7 cm in the right frontal region (2:17), 0.6 x   0.5 cm at the right frontal vertex (4:32) and 1.0 x 0.8 cm in the right   anterior frontal region (2:39). Vasogenic edema is also noted in the left   cerebellum. Some of the metastatic lesions demonstrate increased   attenuation, which may contain blood products/proteinaceous debris. Some   of the metastatic lesions, for example, in the left centrum semiovale,   left medial temporal lobe, right anterior frontal lobe and right   cerebellum demonstrate central hyperattenuation, indicating necrosis. The   left temporal metastatic lesion resultant mass effect with effacement of   the left temporal horn and a 0.3 cm rightward midline shift, indicating   subfalcine herniation. There is also mild mass effect on the left frontal   horn by the necrotic left centrum semiovale lesion.     Diffuse periventricular and subcortical white matter lucencies may   related to chronic microvascular ischemic changes and/or vasogenic edema.   Small lucencies in the feroz may be related to artifact or   age-indeterminate infarcts although additional metastasis cannot be   excluded. There is mild cerebral volume loss with commensurate   ventricular dilatation.    There is no depressed skull fracture. There is minimal mucosal thickening   of the sinuses. The tympanomastoid region is clear.      Impression:     Multiple supratentorial and infratentorial metastatic lesions with some   lesions containing blood products/debris and necrosis as described. The   left temporal metastatic lesion resultant mass effect with effacement of   the left temporal horn and a 0.3 cm rightward midline shift, indicating   subfalcine herniation.       MEDICATIONS   MEDICATIONS  (STANDING):  dexamethasone  Injectable 4 milliGRAM(s) IV Push every 6 hours  enoxaparin Injectable 40 milliGRAM(s) SubCutaneous daily  pantoprazole    Tablet 40 milliGRAM(s) Oral before breakfast        ASSESSMENT/PLAN  68F with metastatic lung cancer (mets to liver and brain) with right hemiparesis, cognitive deficits, functional, gait, ADL impairments.    Disposition -  Brain mets - Decadron 4mg Q6hrs, Radiation therapy started , will require total 10 session (Mon-Friday) per Rad Onc  Impulsivity - 1:1 supervision  PT - ROM, Bed mobility, Transfers, Ambulation with assistive device  OT - ADLs, ROM, Recommend resting hand splint to right UE  SLP - Cognitive eval  Precautions - Falls  DVT Prophylaxis - Lovenox  Weight bearing status - Full  Skin - Turn Q2hrs  Diet - Regular CC: Right sided weakness    HPI:  68F PMHx stage IV lung cancer, with recently diagnosed mets to liver and brain presented to [facility]  s/p fall. Patient is confused and poor historian. Per chart, patient fell at home approximately 1 week ago and was noted to have right sided weakness. She was evaluated at [facility] and diagnosed with mets to the liver and brain. She was discharged home with her family and over the last week has continued to be confused, impulsive, and had trouble walking with frequent falls, prompting this hospital admission. Head CT on admission showed multiple supra and infratentorial metastatic lesions, with a left temporal lesion causing midline shift. She was evaluated by neurosurgery and no surgical intervention was recommended due to poor prognosis. She was started on decadron and radiation therapy has been initiated. Palliative care is also following the case. Patient is on 1:1 supervision due to impulsivity. Per 1:1 aide at bedside, patient requires assist to bathroom and is unsteady on her feet, frequently trying to climb out of bed.      REVIEW OF SYSTEMS - Limited secondary to cognition  Constitutional - No fever, No fatigue  HEENT - No visual disturbances, No difficulty hearing,  No neck pain  Respiratory - No cough, No shortness of breath  Cardiovascular - No chest pain, No palpitations  Gastrointestinal - No abdominal pain, No nausea, No vomiting, No diarrhea, No constipation  Genitourinary - No dysuria, No frequency, No incontinence  Neurological - +Right sided weakness, trouble walking  Musculoskeletal - No joint pain, No joint swelling, No muscle pain  Psychiatric - No depression, No anxiety  All other review of systems negative    PAST MEDICAL & SURGICAL HISTORY  Stage IV lung cancer - mets to brain and liver  No significant past surgical history      SOCIAL HISTORY  History of smoking  EtOH - Denied   Drugs - Denied    FUNCTIONAL HISTORY  Right hand dominant  Per chart, patient lives with son. Patient confused, but reports she lives with daughter in private house with 4-5 ODILIA, 1 flight inside  Was requiring assist at home for ambulation and ADLs due to impulsivity and confusion, frequent falls  Retired, formerly worked making cigarettes    CURRENT FUNCTIONAL STATUS  Bed mobility - min assist  Transfers - min assist  Gait - Amb 10' hand held assist      FAMILY HISTORY   Reviewed and non-contributory    ALLERGIES  No Known Allergies      VITALS  T(C): 36.9 (18 May 2018 09:19), Max: 36.9 (17 May 2018 13:44)  T(F): 98.4 (18 May 2018 09:19), Max: 98.4 (17 May 2018 13:44)  HR: 76 (18 May 2018 09:19) (65 - 81)  BP: 105/62 (18 May 2018 09:19) (105/62 - 140/70)  RR: 18 (18 May 2018 09:19) (18 - 19)  SpO2: 97% (18 May 2018 09:19) (94% - 97%)      PHYSICAL EXAM  Constitutional - NAD, Comfortable  HEENT - NCAT, EOMI  Neck - Supple  Chest - CTA bilaterally  Cardiovascular - RRR, S1S2  Abdomen - BS+, Soft, NTND  Extremities - Edema right LE, No calf tenderness   Neurologic Exam -                    Cognitive - Awake, Alert, AAO to self only. Requires assist for place, month, year and situation     Communication - Fluent speech, but answers with short responses. No dysarthria, naming and repetition intact     Attention - Able to recite days of week forward, follows multistep commands     Memory - Recalls 0/3 words without assist, 1/3 with cues     Cranial Nerves - CN 2-12 grossly intact     Motor -                     LEFT    UE - ShAB 5/5, EF 5/5, EE 5/5, WE 5/5,  5/5                    RIGHT UE - ShAB 1/5, EF 2/5, EE 2/5, increased tone in right hand and wrist                    LEFT    LE - HF 5/5, KE 5/5, DF 5/5, PF 5/5                    RIGHT LE - HF 4/5, KE 4/5, DF 4/5, PF 4/5        Sensory - Intact to light touch     Reflexes - DTR brisk in bilateral UE  Psychiatric - Affect WNL      RECENT LABS/IMAGING                        13.8   12.3  )-----------( 217      ( 17 May 2018 04:44 )             41.4     05-17    138  |  100  |  22.0<H>  ----------------------------<  132<H>  4.2   |  25.0  |  0.60    Ca    9.1      17 May 2018 04:44    TPro  6.5<L>  /  Alb  3.8  /  TBili  0.7  /  DBili  x   /  AST  15  /  ALT  26  /  AlkPhos  59  05-17    PT/INR - ( 17 May 2018 04:44 )   PT: 10.4 sec;   INR: 0.95 ratio         PTT - ( 17 May 2018 04:44 )  PTT:23.8 sec  Urinalysis Basic - ( 17 May 2018 13:06 )    Color: Yellow / Appearance: Clear / S.025 / pH: x  Gluc: x / Ketone: Negative  / Bili: Negative / Urobili: Negative mg/dL   Blood: x / Protein: 15 mg/dL / Nitrite: Negative   Leuk Esterase: Trace / RBC: x / WBC 3-5   Sq Epi: x / Non Sq Epi: Occasional / Bacteria: x      EXAM:  CT BRAIN                          PROCEDURE DATE:  2018      INTERPRETATION:  Clinical indication: History of brain metastasis,   increased falls.    Technique: CT axial images of the head were obtained without intravenous   contrast. Computer-reconstructed coronal and sagittal images were   obtained.    Comparison:  None.    Findings: There are multiple supratentorial and infratentorial metastatic   lesions with varying attenuation and vasogenic edema, for example, 2.8 x   2.1 cm in the left medial temporal lobe (2:18), 1.1 x 1.3 cm splenium of   the left corpus callosum (2:28), 2.5 x 1.9 cm and 0.8 x 0.8 cm in the   right cerebellum (2:15), 0.7 x 0.7 cm in the right lateral cerebellum   (2:14), 1.3 x 1.1 cm (2:35) and 1.5 x 1.4 cm in the left centrum   semiovale (2:32), 0.7 x 0.7 cm in the right frontal region (2:17), 0.6 x   0.5 cm at the right frontal vertex (4:32) and 1.0 x 0.8 cm in the right   anterior frontal region (2:39). Vasogenic edema is also noted in the left   cerebellum. Some of the metastatic lesions demonstrate increased   attenuation, which may contain blood products/proteinaceous debris. Some   of the metastatic lesions, for example, in the left centrum semiovale,   left medial temporal lobe, right anterior frontal lobe and right   cerebellum demonstrate central hyperattenuation, indicating necrosis. The   left temporal metastatic lesion resultant mass effect with effacement of   the left temporal horn and a 0.3 cm rightward midline shift, indicating   subfalcine herniation. There is also mild mass effect on the left frontal   horn by the necrotic left centrum semiovale lesion.     Diffuse periventricular and subcortical white matter lucencies may   related to chronic microvascular ischemic changes and/or vasogenic edema.   Small lucencies in the feroz may be related to artifact or   age-indeterminate infarcts although additional metastasis cannot be   excluded. There is mild cerebral volume loss with commensurate   ventricular dilatation.    There is no depressed skull fracture. There is minimal mucosal thickening   of the sinuses. The tympanomastoid region is clear.      Impression:     Multiple supratentorial and infratentorial metastatic lesions with some   lesions containing blood products/debris and necrosis as described. The   left temporal metastatic lesion resultant mass effect with effacement of   the left temporal horn and a 0.3 cm rightward midline shift, indicating   subfalcine herniation.       MEDICATIONS   MEDICATIONS  (STANDING):  dexamethasone  Injectable 4 milliGRAM(s) IV Push every 6 hours  enoxaparin Injectable 40 milliGRAM(s) SubCutaneous daily  pantoprazole    Tablet 40 milliGRAM(s) Oral before breakfast        ASSESSMENT/PLAN  68F with metastatic lung cancer (mets to liver and brain) with right hemiparesis, cognitive deficits, functional, gait, ADL impairments.    Disposition - Recommend daily PT/OT while inpatient to maximize function. Will follow-up after family meeting with palliative care and goals of care are discussed. Not a candidate for rehab while receiving radiation therapy.   Brain mets - Decadron 4mg Q6hrs, Radiation therapy started , will require total 10 session (Mon-Friday) per Rad Onc  Impulsivity - 1:1 supervision  PT - ROM, Bed mobility, Transfers, Ambulation with assistive device  OT - ADLs, ROM, Recommend resting hand splint to right UE  SLP - Cognitive eval  Precautions - Falls  DVT Prophylaxis - Lovenox  Weight bearing status - Full  Skin - Turn Q2hrs  Diet - Regular CC: Right sided weakness    HPI:  68F PMHx stage IV lung cancer, with recently diagnosed mets to liver and brain presented to [facility]  s/p fall. Patient is confused and poor historian. Per chart, patient fell at home approximately 1 week ago and was noted to have right sided weakness. She was evaluated at [facility] and diagnosed with mets to the liver and brain. She was discharged home with her family and over the last week has continued to be confused, impulsive, and had trouble walking with frequent falls, prompting this hospital admission. Head CT on admission showed multiple supra and infratentorial metastatic lesions, with a left temporal lesion causing midline shift. She was evaluated by neurosurgery and no surgical intervention was recommended due to poor prognosis. She was started on decadron and radiation therapy has been initiated. Palliative care is also following the case. Patient is on 1:1 supervision due to impulsivity. Per 1:1 aide at bedside, patient requires assist to bathroom and is unsteady on her feet, frequently trying to climb out of bed.      REVIEW OF SYSTEMS - Limited secondary to cognition  Constitutional - No fever, No fatigue  HEENT - No visual disturbances, No difficulty hearing,  No neck pain  Respiratory - No cough, No shortness of breath  Cardiovascular - No chest pain, No palpitations  Gastrointestinal - No abdominal pain, No nausea, No vomiting, No diarrhea, No constipation  Genitourinary - No dysuria, No frequency, No incontinence  Neurological - +Right sided weakness, trouble walking  Musculoskeletal - No joint pain, No joint swelling, No muscle pain  Psychiatric - No depression, No anxiety  All other review of systems negative    PAST MEDICAL & SURGICAL HISTORY  Stage IV lung cancer - mets to brain and liver  No significant past surgical history      SOCIAL HISTORY  History of smoking  EtOH - Denied   Drugs - Denied    FUNCTIONAL HISTORY  Right hand dominant  Per chart, patient lives with son. Patient confused, but reports she lives with daughter in private house with 4-5 ODILIA, 1 flight inside  Was requiring assist at home for ambulation and ADLs due to impulsivity and confusion, frequent falls  Retired, formerly worked making cigarettes    CURRENT FUNCTIONAL STATUS  Bed mobility - min assist  Transfers - min assist  Gait - Amb 10' hand held assist      FAMILY HISTORY   Reviewed and non-contributory    ALLERGIES  No Known Allergies      VITALS  T(C): 36.9 (18 May 2018 09:19), Max: 36.9 (17 May 2018 13:44)  T(F): 98.4 (18 May 2018 09:19), Max: 98.4 (17 May 2018 13:44)  HR: 76 (18 May 2018 09:19) (65 - 81)  BP: 105/62 (18 May 2018 09:19) (105/62 - 140/70)  RR: 18 (18 May 2018 09:19) (18 - 19)  SpO2: 97% (18 May 2018 09:19) (94% - 97%)      PHYSICAL EXAM  Constitutional - NAD, Comfortable  HEENT - NCAT, EOMI  Neck - Supple  Chest - CTA bilaterally  Cardiovascular - RRR, S1S2  Abdomen - BS+, Soft, NTND  Extremities - Edema right LE, No calf tenderness   Neurologic Exam -                    Cognitive - Awake, Alert, AAO to self only. Requires assist for place, month, year and situation     Communication - Fluent speech, but answers with short responses. No dysarthria, naming and repetition intact     Attention - Able to recite days of week forward, follows multistep commands     Memory - Recalls 0/3 words without assist, 1/3 with cues     Cranial Nerves - CN 2-12 grossly intact     Motor -                     LEFT    UE - ShAB 5/5, EF 5/5, EE 5/5, WE 5/5,  5/5                    RIGHT UE - ShAB 1/5, EF 2/5, EE 2/5, increased tone in right hand and wrist                    LEFT    LE - HF 5/5, KE 5/5, DF 5/5, PF 5/5                    RIGHT LE - HF 4/5, KE 4/5, DF 4/5, PF 4/5        Sensory - Intact to light touch     Reflexes - DTR brisk in bilateral UE  Psychiatric - Affect WNL      RECENT LABS/IMAGING                        13.8   12.3  )-----------( 217      ( 17 May 2018 04:44 )             41.4     05-17    138  |  100  |  22.0<H>  ----------------------------<  132<H>  4.2   |  25.0  |  0.60    Ca    9.1      17 May 2018 04:44    TPro  6.5<L>  /  Alb  3.8  /  TBili  0.7  /  DBili  x   /  AST  15  /  ALT  26  /  AlkPhos  59  05-17    PT/INR - ( 17 May 2018 04:44 )   PT: 10.4 sec;   INR: 0.95 ratio         PTT - ( 17 May 2018 04:44 )  PTT:23.8 sec  Urinalysis Basic - ( 17 May 2018 13:06 )    Color: Yellow / Appearance: Clear / S.025 / pH: x  Gluc: x / Ketone: Negative  / Bili: Negative / Urobili: Negative mg/dL   Blood: x / Protein: 15 mg/dL / Nitrite: Negative   Leuk Esterase: Trace / RBC: x / WBC 3-5   Sq Epi: x / Non Sq Epi: Occasional / Bacteria: x      EXAM:  CT BRAIN                          PROCEDURE DATE:  2018      INTERPRETATION:  Clinical indication: History of brain metastasis,   increased falls.    Technique: CT axial images of the head were obtained without intravenous   contrast. Computer-reconstructed coronal and sagittal images were   obtained.    Comparison:  None.    Findings: There are multiple supratentorial and infratentorial metastatic   lesions with varying attenuation and vasogenic edema, for example, 2.8 x   2.1 cm in the left medial temporal lobe (2:18), 1.1 x 1.3 cm splenium of   the left corpus callosum (2:28), 2.5 x 1.9 cm and 0.8 x 0.8 cm in the   right cerebellum (2:15), 0.7 x 0.7 cm in the right lateral cerebellum   (2:14), 1.3 x 1.1 cm (2:35) and 1.5 x 1.4 cm in the left centrum   semiovale (2:32), 0.7 x 0.7 cm in the right frontal region (2:17), 0.6 x   0.5 cm at the right frontal vertex (4:32) and 1.0 x 0.8 cm in the right   anterior frontal region (2:39). Vasogenic edema is also noted in the left   cerebellum. Some of the metastatic lesions demonstrate increased   attenuation, which may contain blood products/proteinaceous debris. Some   of the metastatic lesions, for example, in the left centrum semiovale,   left medial temporal lobe, right anterior frontal lobe and right   cerebellum demonstrate central hyperattenuation, indicating necrosis. The   left temporal metastatic lesion resultant mass effect with effacement of   the left temporal horn and a 0.3 cm rightward midline shift, indicating   subfalcine herniation. There is also mild mass effect on the left frontal   horn by the necrotic left centrum semiovale lesion.     Diffuse periventricular and subcortical white matter lucencies may   related to chronic microvascular ischemic changes and/or vasogenic edema.   Small lucencies in the feroz may be related to artifact or   age-indeterminate infarcts although additional metastasis cannot be   excluded. There is mild cerebral volume loss with commensurate   ventricular dilatation.    There is no depressed skull fracture. There is minimal mucosal thickening   of the sinuses. The tympanomastoid region is clear.      Impression:     Multiple supratentorial and infratentorial metastatic lesions with some   lesions containing blood products/debris and necrosis as described. The   left temporal metastatic lesion resultant mass effect with effacement of   the left temporal horn and a 0.3 cm rightward midline shift, indicating   subfalcine herniation.       MEDICATIONS   MEDICATIONS  (STANDING):  dexamethasone  Injectable 4 milliGRAM(s) IV Push every 6 hours  enoxaparin Injectable 40 milliGRAM(s) SubCutaneous daily  pantoprazole    Tablet 40 milliGRAM(s) Oral before breakfast        ASSESSMENT/PLAN  68F with metastatic lung cancer (mets to liver and brain) with right hemiparesis, cognitive deficits, functional, gait, ADL impairments.    Disposition - Recommend daily PT/OT while inpatient to maximize function.   Brain mets - Decadron 4mg Q6hrs, Radiation therapy started , will require total 10 session per Rad Onc  Impulsivity - 1:1 supervision  PT - ROM, Bed mobility, Transfers, Ambulation with assistive device  OT - ADLs, ROM, Recommend resting hand splint to right UE  Precautions - Falls  DVT Prophylaxis - Lovenox  Weight bearing status - Full  Skin - Turn Q2hrs  Diet - Regular

## 2018-05-18 NOTE — PROGRESS NOTE ADULT - SUBJECTIVE AND OBJECTIVE BOX
PMD: unknown    CHIEF COMPLAINT: fall    Patient seen and examined at the bedside. No acute overnight events. sp transfer for whole brain radiation to [facility] yesterday. Complaining of - this AM. Denies fever/chills, headache, lightheadedness, dizziness, chest pain, palpitations, shortness of breath, cough, abd pain, nausea/vomiting/diarrhea, muscle pain.      =========================================================================================  T(C): 36.9 (-18 @ 09:19), Max: 36.9 (-18 @ 13:44)  HR: 76 (-18 @ 09:19) (65 - 81)  BP: 105/62 (-18-18 @ 09:19) (105/62 - 140/70)  RR: 18 (-18-18 @ 09:19) (18 - 19)  SpO2: 97% (-18 @ 09:19) (94% - 97%)    PHYSICAL EXAM.    GEN - appears age appropriate. well nourished. pleasant. no distress.   HEENT - NCAT, EOMI, MAYE  RESP - CTA BL, no wheeze/stridor/rhonchi/crackles. not on supplemental O2. able to speak in full sentences without distress.   CARDIO - NS1S2, RRR. No murmurs/rubs/gallops.  ABD - Soft/Non tender/Non distended. Normal BS x4 quadrants. no guarding/rebound tenderness.  Ext - No FÉLIX.  MSK - BL 5/5 strength on upper and lower extremities.   Neuro - AAOx3. cn 2-12 grossly intact  Psych - normal affect  Skin - c/d/i. no rashes/lesions      I&O's Summary    Daily     Daily     =========================================================================================  LABS.        138  |  100  |  22.0<H>  ----------------------------<  132<H>  4.2   |  25.0  |  0.60    Ca    9.1      17 May 2018 04:44    TPro  6.5<L>  /  Alb  3.8  /  TBili  0.7  /  DBili  x   /  AST  15  /  ALT  26  /  AlkPhos  59                            13.8   12.3  )-----------( 217      ( 17 May 2018 04:44 )             41.4     LIVER FUNCTIONS - ( 17 May 2018 04:44 )  Alb: 3.8 g/dL / Pro: 6.5 g/dL / ALK PHOS: 59 U/L / ALT: 26 U/L / AST: 15 U/L / GGT: x           PT/INR - ( 17 May 2018 04:44 )   PT: 10.4 sec;   INR: 0.95 ratio         PTT - ( 17 May 2018 04:44 )  PTT:23.8 sec      Urinalysis Basic - ( 17 May 2018 13:06 )    Color: Yellow / Appearance: Clear / S.025 / pH: x  Gluc: x / Ketone: Negative  / Bili: Negative / Urobili: Negative mg/dL   Blood: x / Protein: 15 mg/dL / Nitrite: Negative   Leuk Esterase: Trace / RBC: x / WBC 3-5   Sq Epi: x / Non Sq Epi: Occasional / Bacteria: x          =========================================================================================  IMAGING.     =========================================================================================    MEDICATIONS  (STANDING):  dexamethasone  Injectable 4 milliGRAM(s) IV Push every 6 hours  enoxaparin Injectable 40 milliGRAM(s) SubCutaneous daily  pantoprazole    Tablet 40 milliGRAM(s) Oral before breakfast    MEDICATIONS  (PRN): PMD: unknown    CHIEF COMPLAINT: fall    Patient seen and examined at the bedside. No acute overnight events. sp transfer for whole brain radiation to [facility] yesterday + this AM. Complaining of nothing this AM but wants to go home. Denies fever/chills, headache, lightheadedness, dizziness, chest pain, palpitations, shortness of breath, cough, abd pain, nausea/vomiting/diarrhea, muscle pain.      =========================================================================================  T(C): 36.9 (18 @ 09:19), Max: 36.9 (18 @ 13:44)  HR: 76 (18 @ 09:19) (65 - 81)  BP: 105/62 (-18 @ 09:19) (105/62 - 140/70)  RR: 18 (-18 @ 09:19) (18 - 19)  SpO2: 97% (18 @ 09:19) (94% - 97%)    PHYSICAL EXAM.    GEN - appears age appropriate. well nourished. pleasant. no distress.   HEENT - NCAT, EOMI, MAYE  RESP - CTA BL, no wheeze/stridor/rhonchi/crackles. not on supplemental O2. able to speak in full sentences without distress.   CARDIO - NS1S2, RRR. No murmurs/rubs/gallops.  ABD - Soft/Non tender/Non distended. Normal BS x4 quadrants. no guarding/rebound tenderness.  Ext - No FÉLIX.  MSK - BL 5/5 strength on upper and lower extremities on Lt side. 3-4/5 RUE strength with contracted digits in a fist, 4/5 RLE strength. mild LUE distal tremor.    Neuro - AAOx2 (not to time or situation)  Psych - normal affect. limited attention span. occ with flight of ideas. redirectable. limited responses to questioning. not combative.   Skin - c/d/i. no rashes/lesions      I&O's Summary    Daily     Daily     =========================================================================================  LABS.    -    138  |  100  |  22.0<H>  ----------------------------<  132<H>  4.2   |  25.0  |  0.60    Ca    9.1      17 May 2018 04:44    TPro  6.5<L>  /  Alb  3.8  /  TBili  0.7  /  DBili  x   /  AST  15  /  ALT  26  /  AlkPhos  59  -                          13.8   12.3  )-----------( 217      ( 17 May 2018 04:44 )             41.4     LIVER FUNCTIONS - ( 17 May 2018 04:44 )  Alb: 3.8 g/dL / Pro: 6.5 g/dL / ALK PHOS: 59 U/L / ALT: 26 U/L / AST: 15 U/L / GGT: x           PT/INR - ( 17 May 2018 04:44 )   PT: 10.4 sec;   INR: 0.95 ratio         PTT - ( 17 May 2018 04:44 )  PTT:23.8 sec      Urinalysis Basic - ( 17 May 2018 13:06 )    Color: Yellow / Appearance: Clear / S.025 / pH: x  Gluc: x / Ketone: Negative  / Bili: Negative / Urobili: Negative mg/dL   Blood: x / Protein: 15 mg/dL / Nitrite: Negative   Leuk Esterase: Trace / RBC: x / WBC 3-5   Sq Epi: x / Non Sq Epi: Occasional / Bacteria: x          =========================================================================================  IMAGING.     =========================================================================================    MEDICATIONS  (STANDING):  dexamethasone  Injectable 4 milliGRAM(s) IV Push every 6 hours  enoxaparin Injectable 40 milliGRAM(s) SubCutaneous daily  pantoprazole    Tablet 40 milliGRAM(s) Oral before breakfast    MEDICATIONS  (PRN):

## 2018-05-18 NOTE — CONSULT NOTE ADULT - CONSULT REASON
Evaluate rehab needs
Goals of care - Metastatic cancer to brain
FALL TONIGHT, HIT HEAD  MULT FREQUENT FALLS THIS WEEK    Multiple supratentorial and infratentorial metastatic lesions with some   lesions containing blood products/debris and necrosis as described. The left   temporal metastatic lesion resultant mass effect with effacement of the left   temporal horn and a 0.3 cm rightward midline shift, indicating subfalcine   herniation.

## 2018-05-18 NOTE — OCCUPATIONAL THERAPY INITIAL EVALUATION ADULT - PLANNED THERAPY INTERVENTIONS, OT EVAL
balance training/ADL retraining/cognitive, visual perceptual/toilet/transfer training/bed mobility training ADL retraining/bed mobility training/cognitive, visual perceptual/fine motor coordination training/neuromuscular re-education/transfer training/toilet/balance training

## 2018-05-18 NOTE — OCCUPATIONAL THERAPY INITIAL EVALUATION ADULT - MODIFIED CLINICAL TEST OF SENSORY INTEGRATION IN BALANCE TEST
static sitting edge of bed with contact guard assistance; static standing with minimum assistance; dynamic standing with moderate/maximum assistance

## 2018-05-18 NOTE — OCCUPATIONAL THERAPY INITIAL EVALUATION ADULT - PERTINENT HX OF CURRENT PROBLEM, REHAB EVAL
s/p fall Pt arrives to ER in wheelchair with family who report pt with decreased movement to right side and has had frequent falls

## 2018-05-18 NOTE — OCCUPATIONAL THERAPY INITIAL EVALUATION ADULT - MANUAL MUSCLE TESTING RESULTS, REHAB EVAL
left UE grossly assessed due to decreased cognition however pt with LUE AROM WFL 3/5; right UE not tested

## 2018-05-18 NOTE — CONSULT NOTE ADULT - ATTENDING COMMENTS
Chart reviewed. Patient seen at bedside. 1:1 present.  68 year old female with diagnoses of Lung CA admitted to [facility], after recent hospital stay at [facility] where she had a liver biopsy biopsy.  Patient with metastatic disease to liver and brain. MRI brain with multiple lesions and mass effect and midline shift. Patient with right hemiparesis, personality changes and memory impairment.  Has been started on RT.   Rehab goals to improve overall function for enhanced QOL are limited in the setting of current diagnosis. Recommend supportive care. Will continue PT while in hospital.   D/w Palliative care Physician, Dr. Nguyen and with [facility]  Thank you

## 2018-05-18 NOTE — OCCUPATIONAL THERAPY INITIAL EVALUATION ADULT - ORIENTATION, REHAB EVAL
Pt unable to state her name stating "I can't think of it" when provided with option, pt chose name. Pt aware she is in hospital but identifies as "[facility]" despite cues

## 2018-05-19 PROCEDURE — 99233 SBSQ HOSP IP/OBS HIGH 50: CPT

## 2018-05-19 RX ORDER — ACETAMINOPHEN 500 MG
650 TABLET ORAL ONCE
Qty: 0 | Refills: 0 | Status: COMPLETED | OUTPATIENT
Start: 2018-05-19 | End: 2018-05-19

## 2018-05-19 RX ADMIN — Medication 650 MILLIGRAM(S): at 02:16

## 2018-05-19 RX ADMIN — LEVETIRACETAM 750 MILLIGRAM(S): 250 TABLET, FILM COATED ORAL at 19:25

## 2018-05-19 RX ADMIN — ENOXAPARIN SODIUM 40 MILLIGRAM(S): 100 INJECTION SUBCUTANEOUS at 12:57

## 2018-05-19 RX ADMIN — Medication 4 MILLIGRAM(S): at 05:10

## 2018-05-19 RX ADMIN — Medication 4 MILLIGRAM(S): at 19:25

## 2018-05-19 RX ADMIN — LEVETIRACETAM 750 MILLIGRAM(S): 250 TABLET, FILM COATED ORAL at 05:10

## 2018-05-19 RX ADMIN — Medication 4 MILLIGRAM(S): at 12:56

## 2018-05-19 RX ADMIN — Medication 650 MILLIGRAM(S): at 02:46

## 2018-05-19 RX ADMIN — PANTOPRAZOLE SODIUM 40 MILLIGRAM(S): 20 TABLET, DELAYED RELEASE ORAL at 05:10

## 2018-05-19 RX ADMIN — Medication 2 MILLIGRAM(S): at 15:01

## 2018-05-19 NOTE — PROGRESS NOTE ADULT - SUBJECTIVE AND OBJECTIVE BOX
CC: Brain and liver metastatic disease. Patient is confused.   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 Case discussed @ length with son, pt is poor historian due to being aaox2. Per son, pt recently with onset of Rt sided weakness several weeks ago prompting eval in ER @ [facility] to r/o CVA. At that time noted to have mets to the brain and on futher exam seen to have lung mass suspected to be primary lesion with mets also involving the liver. Liver bx taken, results pending. Since discharge from [facility], pt managed @ home by son but noted to have worsening complications of altered personality increased paranoia and agitation, and gait instability. Has had approx 20 falls since dx. Last fall prompting presentation to the hospital at this time given trauma to the head. Cannot assess whether LOC was present or not. Pt has become an extreme risk at home because of agitation prompting her to constantly attempt to ambulate or climb out of bed alone and has required 24hr surveillance by family. (17 May 2018 08:32)    REVIEW OF SYSTEMS:    Patient denied fever, chills, abdominal pain, nausea, vomiting, cough, shortness of breath, chest pain or palpitations    Vital Signs Last 24 Hrs  T(C): 36.9 (19 May 2018 07:23), Max: 36.9 (19 May 2018 07:23)  T(F): 98.4 (19 May 2018 07:23), Max: 98.4 (19 May 2018 07:23)  HR: 65 (19 May 2018 07:23) (64 - 74)  BP: 102/56 (19 May 2018 07:23) (102/56 - 138/76)  BP(mean): 3 (18 May 2018 16:16) (3 - 3)  RR: 18 (19 May 2018 07:23) (18 - 18)  SpO2: 98% (18 May 2018 23:29) (96% - 98%)I&O's Summary    PHYSICAL EXAM:  GENERAL: NAD,   HEENT: PERRL, +EOMI, anicteric, no [place]  NECK: Supple, No JVD   CHEST/LUNG: CTA bilaterally; Normal effort  HEART: S1S2 Normal intensity, no murmurs, gallops or rubs noted  ABDOMEN: Soft, BS Normoactive, NT, ND, no HSM noted  EXTREMITIES:  2+ radial and DP pulses noted, no clubbing, cyanosis, or edema noted, Limited mobility   SKIN: No rashes or lesions noted  NEURO: Confused and disorientated, , CN II-XII intact  PSYCH: Depressed mood and affect; insight/judgement inappropriate  LABS:              RADIOLOGY & ADDITIONAL TESTS:    MEDICATIONS:  MEDICATIONS  (STANDING):  dexamethasone     Tablet 4 milliGRAM(s) Oral every 6 hours  enoxaparin Injectable 40 milliGRAM(s) SubCutaneous daily  levETIRAcetam 750 milliGRAM(s) Oral two times a day  pantoprazole    Tablet 40 milliGRAM(s) Oral before breakfast    MEDICATIONS  (PRN):  LORazepam   Injectable 2 milliGRAM(s) IntraMuscular every 6 hours PRN Agitation

## 2018-05-19 NOTE — PROGRESS NOTE ADULT - ASSESSMENT
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     YAHAIRA Metastatic Lung Ca- STABLE> recently dx weeks prior to presentation. initially suspected Small Cell but recent liver bx from [facility] showing high grade Ca w/ neuroendocrine features despite neuroendocrine serum markers being neg per Onc, pending sec opinion on path. noted to have mets to lymph nodes + brain + liver. regarding brain mets, noted to have mult supra + infratentorial brain mets containing blood products/debris w/ necrosis + Lt temporal horn effacement complicated by mass effect causing subfalcine herniation as per CTH. complicated by severe incoordination, RUE weakness, cognitive impairment, + intermittent receptive aphasia. PLAN. decadon IV. radiation therapy to brain per Dr Mondragon, will need 10 total tx, to be transferred daily Mon - Fri, first session 5/17, well tolerated uncomplicated. Palliative consult appreciated, pending family meeting. neurosx consult appreciated, poor prognosis, no sx intervention offered @ this time. Onc consult appreciated, will fu (seen by [facility]). pt family cannot care for her needs @ this time given advanced disease with complications, likely transfer to [facility] or acute rehab, overall good candidate for hospice. will need to clarify with CW if acute rehab accepts pt on radiation therapy.     Gait instability - complicated by frequent falls. most recent fall just prior to admit to Rt frontoparietal head. likely sec to brain mets causing gait isntability. PT eval appreciated, rec quad cane w/ various dispo options including home w/ assist, [facility], home with 24/7 care, acute rehab. PLAN. PMR + OT consult. fall precautions.     Personalisty Change- case discussed with son, pt with inc agitation + uncooperation + onset of angry ep, per family this is significant change of personality. suspect sec to brain mets. PLAN. cont family counseling. redirection when needed. attempt non pharmacological means first as tolerated. consider psych consult PRN. cont 1:1 for impulsivity.   Will give ativan prn for agitations and anxiety.  Supportive care    Leukocytosis- mild. no signs active infection. UA neg. most likely sec to malingnacy. PLAN. no further intervention, no need to repeat cbc unless acute clinical need

## 2018-05-20 LAB
ANION GAP SERPL CALC-SCNC: 14 MMOL/L — SIGNIFICANT CHANGE UP (ref 5–17)
BUN SERPL-MCNC: 21 MG/DL — HIGH (ref 8–20)
CALCIUM SERPL-MCNC: 9.4 MG/DL — SIGNIFICANT CHANGE UP (ref 8.6–10.2)
CHLORIDE SERPL-SCNC: 100 MMOL/L — SIGNIFICANT CHANGE UP (ref 98–107)
CO2 SERPL-SCNC: 24 MMOL/L — SIGNIFICANT CHANGE UP (ref 22–29)
CREAT SERPL-MCNC: 0.51 MG/DL — SIGNIFICANT CHANGE UP (ref 0.5–1.3)
GLUCOSE SERPL-MCNC: 140 MG/DL — HIGH (ref 70–115)
HCT VFR BLD CALC: 44.8 % — SIGNIFICANT CHANGE UP (ref 37–47)
HGB BLD-MCNC: 15 G/DL — SIGNIFICANT CHANGE UP (ref 12–16)
MCHC RBC-ENTMCNC: 31.3 PG — HIGH (ref 27–31)
MCHC RBC-ENTMCNC: 33.5 G/DL — SIGNIFICANT CHANGE UP (ref 32–36)
MCV RBC AUTO: 93.3 FL — SIGNIFICANT CHANGE UP (ref 81–99)
PLATELET # BLD AUTO: 224 K/UL — SIGNIFICANT CHANGE UP (ref 150–400)
POTASSIUM SERPL-MCNC: 4.1 MMOL/L — SIGNIFICANT CHANGE UP (ref 3.5–5.3)
POTASSIUM SERPL-SCNC: 4.1 MMOL/L — SIGNIFICANT CHANGE UP (ref 3.5–5.3)
RBC # BLD: 4.8 M/UL — SIGNIFICANT CHANGE UP (ref 4.4–5.2)
RBC # FLD: 14.9 % — SIGNIFICANT CHANGE UP (ref 11–15.6)
SODIUM SERPL-SCNC: 138 MMOL/L — SIGNIFICANT CHANGE UP (ref 135–145)
WBC # BLD: 8 K/UL — SIGNIFICANT CHANGE UP (ref 4.8–10.8)
WBC # FLD AUTO: 8 K/UL — SIGNIFICANT CHANGE UP (ref 4.8–10.8)

## 2018-05-20 PROCEDURE — 99233 SBSQ HOSP IP/OBS HIGH 50: CPT

## 2018-05-20 RX ADMIN — LEVETIRACETAM 750 MILLIGRAM(S): 250 TABLET, FILM COATED ORAL at 17:36

## 2018-05-20 RX ADMIN — Medication 4 MILLIGRAM(S): at 11:43

## 2018-05-20 RX ADMIN — PANTOPRAZOLE SODIUM 40 MILLIGRAM(S): 20 TABLET, DELAYED RELEASE ORAL at 06:35

## 2018-05-20 RX ADMIN — Medication 4 MILLIGRAM(S): at 06:35

## 2018-05-20 RX ADMIN — Medication 2 MILLIGRAM(S): at 06:46

## 2018-05-20 RX ADMIN — Medication 2 MILLIGRAM(S): at 00:18

## 2018-05-20 RX ADMIN — Medication 4 MILLIGRAM(S): at 17:36

## 2018-05-20 RX ADMIN — Medication 4 MILLIGRAM(S): at 00:17

## 2018-05-20 RX ADMIN — ENOXAPARIN SODIUM 40 MILLIGRAM(S): 100 INJECTION SUBCUTANEOUS at 11:43

## 2018-05-20 RX ADMIN — LEVETIRACETAM 750 MILLIGRAM(S): 250 TABLET, FILM COATED ORAL at 06:35

## 2018-05-20 NOTE — PROGRESS NOTE ADULT - ASSESSMENT
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     YAHAIRA Metastatic Lung Ca- STABLE> recently dx weeks prior to presentation.  liver bx from [facility] showing high grade Ca w/ neuroendocrine features despite neuroendocrine serum markers being neg per Onc, pending sec opinion on path. noted to have mets to lymph nodes + brain + liver., noted to have supra + infratentorial brain mets containing blood products/debris w/ necrosis + Lt temporal horn effacement complicated by mass effect causing subfalcine herniation as per CTH. complicated by severe incoordination, RUE weakness, cognitive impairment, + intermittent receptive aphasia. PLAN. decadon IV. radiation therapy to brain per Dr Mondragon, will need 10 total tx, to be transferred daily Mon - Fri, first session 5/17, well tolerated uncomplicated. Palliative consult appreciated, pending family meeting. neurosx consult appreciated, poor prognosis, no sx intervention offered @ this time. Onc consult appreciated, will fu (seen by [facility]). pt family cannot care for her needs @ this time given advanced disease with complications, likely transfer to [facility] or acute rehab, overall good candidate for hospice. will need to clarify with CW if acute rehab accepts pt on radiation therapy.     Gait instability - complicated by frequent falls. most recent fall just prior to admit to Rt frontoparietal head. likely sec to brain mets causing gait isntability. PT eval appreciated, rec quad cane w/ various dispo options including home w/ assist, [facility], home with 24/7 care, acute rehab. PLAN. PMR + OT consult. fall precautions.     Personalisty Change- case discussed with son, pt with inc agitation + uncooperation + onset of angry ep, per family this is significant change of personality. suspect sec to brain mets. PLAN. cont family counseling. redirection when needed. attempt non pharmacological means first as tolerated. consider psych consult PRN. cont 1:1 for impulsivity.   Will give ativan prn for agitations and anxiety.  Supportive care    Leukocytosis- mild. no signs active infection. UA neg. most likely sec to malingnacy. PLAN. no further intervention, no need to repeat cbc unless acute clinical need     Disposition: supportive care. Palliative care.  Symptoms control . Awaiting [facility] transfer.

## 2018-05-20 NOTE — PROGRESS NOTE ADULT - SUBJECTIVE AND OBJECTIVE BOX
CC: Confusion and disorientation.  Lung cancer with brain metastatic disease.   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 Case discussed @ length with son, pt is poor historian due to being aaox2. Per son, pt recently with onset of Rt sided weakness several weeks ago prompting eval in ER @ [facility] to r/o CVA. At that time noted to have mets to the brain and on futher exam seen to have lung mass suspected to be primary lesion with mets also involving the liver. Liver bx taken, results pending. Since discharge from [facility], pt managed @ home by son but noted to have worsening complications of altered personality increased paranoia and agitation, and gait instability. Has had approx 20 falls since dx. Last fall prompting presentation to the hospital at this time given trauma to the head. Cannot assess whether LOC was present or not. Pt has become an extreme risk at home because of agitation prompting her to constantly attempt to ambulate or climb out of bed alone and has required 24hr surveillance by family. (17 May 2018 08:32)    REVIEW OF SYSTEMS:    Patient denied fever, chills, abdominal pain, nausea, vomiting, cough, shortness of breath, chest pain or palpitations    Vital Signs Last 24 Hrs  T(C): 36.7 (20 May 2018 07:39), Max: 37.1 (20 May 2018 00:21)  T(F): 98.1 (20 May 2018 07:39), Max: 98.8 (20 May 2018 00:21)  HR: 101 (20 May 2018 07:39) (61 - 101)  BP: 123/78 (20 May 2018 07:39) (111/67 - 140/68)  BP(mean): --  RR: 17 (20 May 2018 07:39) (17 - 18)  SpO2: 95% (20 May 2018 07:39) (95% - 99%)I&O's Summary    PHYSICAL EXAM:  GENERAL: NAD,   HEENT: PERRL, +EOMI, anicteric, no [place]  NECK: Supple, No JVD   CHEST/LUNG: CTA bilaterally; Normal effort  HEART: S1S2 Normal intensity, no murmurs, gallops or rubs noted  ABDOMEN: Soft, BS Normoactive, NT, ND, no HSM noted  EXTREMITIES:  2+ radial and DP pulses noted, no clubbing, cyanosis, or edema noted, Limited mobility   SKIN: No rashes or lesions noted  NEURO: Confused  no focal deficits noted, CN II-XII intact  PSYCH: depressed mood and affect; insight/judgement inappropriate  LABS:                        15.0   8.0   )-----------( 224      ( 20 May 2018 10:34 )             44.8     05-20    138  |  100  |  21.0<H>  ----------------------------<  140<H>  4.1   |  24.0  |  0.51    Ca    9.4      20 May 2018 10:34          RADIOLOGY & ADDITIONAL TESTS:    MEDICATIONS:  MEDICATIONS  (STANDING):  dexamethasone     Tablet 4 milliGRAM(s) Oral every 6 hours  enoxaparin Injectable 40 milliGRAM(s) SubCutaneous daily  levETIRAcetam 750 milliGRAM(s) Oral two times a day  pantoprazole    Tablet 40 milliGRAM(s) Oral before breakfast    MEDICATIONS  (PRN):  LORazepam   Injectable 2 milliGRAM(s) IntraMuscular every 6 hours PRN Agitation

## 2018-05-21 VITALS
OXYGEN SATURATION: 99 % | DIASTOLIC BLOOD PRESSURE: 62 MMHG | WEIGHT: 137 LBS | BODY MASS INDEX: 25.21 KG/M2 | RESPIRATION RATE: 16 BRPM | SYSTOLIC BLOOD PRESSURE: 102 MMHG | HEART RATE: 74 BPM | HEIGHT: 62 IN

## 2018-05-21 DIAGNOSIS — R45.1 RESTLESSNESS AND AGITATION: ICD-10-CM

## 2018-05-21 DIAGNOSIS — F05 DELIRIUM DUE TO KNOWN PHYSIOLOGICAL CONDITION: ICD-10-CM

## 2018-05-21 PROCEDURE — 99223 1ST HOSP IP/OBS HIGH 75: CPT

## 2018-05-21 PROCEDURE — 99233 SBSQ HOSP IP/OBS HIGH 50: CPT

## 2018-05-21 RX ORDER — HALOPERIDOL DECANOATE 100 MG/ML
0.5 INJECTION INTRAMUSCULAR EVERY 6 HOURS
Qty: 0 | Refills: 0 | Status: DISCONTINUED | OUTPATIENT
Start: 2018-05-21 | End: 2018-05-25

## 2018-05-21 RX ORDER — DEXAMETHASONE 0.5 MG/5ML
4 ELIXIR ORAL EVERY 12 HOURS
Qty: 0 | Refills: 0 | Status: DISCONTINUED | OUTPATIENT
Start: 2018-05-21 | End: 2018-05-25

## 2018-05-21 RX ADMIN — LEVETIRACETAM 750 MILLIGRAM(S): 250 TABLET, FILM COATED ORAL at 06:00

## 2018-05-21 RX ADMIN — Medication 4 MILLIGRAM(S): at 18:56

## 2018-05-21 RX ADMIN — Medication 4 MILLIGRAM(S): at 13:39

## 2018-05-21 RX ADMIN — Medication 4 MILLIGRAM(S): at 01:10

## 2018-05-21 RX ADMIN — ENOXAPARIN SODIUM 40 MILLIGRAM(S): 100 INJECTION SUBCUTANEOUS at 13:39

## 2018-05-21 RX ADMIN — Medication 4 MILLIGRAM(S): at 06:00

## 2018-05-21 RX ADMIN — LEVETIRACETAM 750 MILLIGRAM(S): 250 TABLET, FILM COATED ORAL at 18:56

## 2018-05-21 RX ADMIN — PANTOPRAZOLE SODIUM 40 MILLIGRAM(S): 20 TABLET, DELAYED RELEASE ORAL at 06:00

## 2018-05-21 NOTE — BEHAVIORAL HEALTH ASSESSMENT NOTE - NSBHCHARTREVIEWVS_PSY_A_CORE FT
Vital Signs Last 24 Hrs  T(C): 36.3 (21 May 2018 07:24), Max: 36.9 (20 May 2018 23:13)  T(F): 97.4 (21 May 2018 07:24), Max: 98.5 (20 May 2018 23:13)  HR: 69 (21 May 2018 07:24) (68 - 98)  BP: 97/59 (21 May 2018 07:24) (97/59 - 111/61)  BP(mean): --  RR: 18 (21 May 2018 07:24) (18 - 18)  SpO2: 97% (21 May 2018 07:24) (96% - 97%)

## 2018-05-21 NOTE — BEHAVIORAL HEALTH ASSESSMENT NOTE - NSBHCHARTREVIEWLAB_PSY_A_CORE FT
15.0   8.0   )-----------( 224      ( 20 May 2018 10:34 )             44.8     05-20    138  |  100  |  21.0<H>  ----------------------------<  140<H>  4.1   |  24.0  |  0.51    Ca    9.4      20 May 2018 10:34

## 2018-05-21 NOTE — PROGRESS NOTE ADULT - ASSESSMENT
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     Metastatic Lung Ca- Currently undergoing [facility] and receiving decadron.   Liver bx from [facility] showing high grade Ca w/ neuroendocrine features despite neuroendocrine serum markers being neg per Onc, pending sec opinion on path. noted to have mets to lymph nodes + brain + liver., noted to have supra + infratentorial brain mets containing bl Lt temporal horn effacement complicated by mass effect causing subfalcine herniation as per CTH. complicated by severe incoordination, RUE weakness, cognitive impairment, + intermittent receptive aphasia.   PLAN. decadon IV. radiation therapy to brain per Dr Mondragon, will need 10 total tx, to be transferred daily Mon - Fri, first session 5/17, well tolerated uncomplicated.   Palliative consult appreciated, pending family meeting. neurosx consult appreciated, poor prognosis, no sx intervention offered @ this time. Onc consult appreciated, will fu (seen by [facility] group). pt family cannot care for her needs @ this time given advanced disease with complications, likely transfer to [facility] or acute rehab, overall good candidate for hospice. will need to clarify with CW if acute rehab accepts pt on radiation therapy.     Gait instability - complicated by frequent falls. most recent fall just prior to admit to Rt frontoparietal head. likely sec to brain mets causing gait isntability. PT eval appreciated, rec quad cane w/ various dispo options including home w/ assist, [facility], home with 24/7 care, acute rehab. PLAN. PMR + OT consult. fall precautions.     Delirium with personality changes- case discussed with son, pt with inc agitation + un-cooperative+ onset of angry ep, per family this is significant change of personality. suspect sec to brain mets. PLAN. cont family counseling. redirection when needed. attempt non pharmacological means first as tolerated. consider psych consult PRN. cont 1:1 for impulsivity.   Will give ativan prn for agitations and anxiety.  Supportive care. Haldol prn per psychiatric consult.     Leukocytosis- mild. no signs active infection. UA neg. most likely sec to malingnacy. PLAN. no further intervention, no need to repeat cbc unless acute clinical need . May be due to high dose steroid.     Disposition: supportive care. Palliative care.  Symptoms control . Awaiting [facility] transfer.

## 2018-05-21 NOTE — BEHAVIORAL HEALTH ASSESSMENT NOTE - HPI (INCLUDE ILLNESS QUALITY, SEVERITY, DURATION, TIMING, CONTEXT, MODIFYING FACTORS, ASSOCIATED SIGNS AND SYMPTOMS)
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  Patient has had change in cognition and personality in the last few weeks.  Asked to evaluate for possible depression.      Patient with 1:1 at bedside.  Patient is intermittently confused and agitated.  As per her son, he noted that she became more irritable and verbally abusive about a month ago. Over the last 2 1/2 weeks she has marked decline in her cognition and change in her mental status to the point where she is not oriented to year.  Patient  denies any stressors.  She reports she wants to go home. She does not express understanding of her medical conditon. She states she has no medical problems.  She stated that she was in [facility] and the year is 1997.  She is not able to state the name of the president. She is able to repeat three words but is not able to remember them.  She cannot spell the word "world" forwards.  She is distractible and with marked latency of response and in appapropriate affect at times.  She has been eating well. She denies any problems with her sleep. She reports her mood is "shitty" but does not elaborate further. She denies any thoughts about wanting to hurt herself or others.  No psychotic or manic sx's were elicited.  As per 1:1, patient has not been aggressive and has been eating well. However, she is often unable to verbalize her needs and attempts to get out of bed and requires redirection. Patient two PRN of Ativan 2 mg IM yesterday and one the day prior for agitation. She did no receive any today.      Spoke with son who provided collateral. He feels patient's behavior change are due to metastatic cancer.  He reports he feels patient's confusion has gotten worse, not better when she has taken Xanax.  HE does not have any safety concerns and does not feel patient exhibited any symptoms of depression. He reports patient gets angry not depressed.

## 2018-05-21 NOTE — BEHAVIORAL HEALTH ASSESSMENT NOTE - SUMMARY
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  Patient has had change in cognition and personality in the last few weeks.  Asked to evaluate for possible depression.  Patient with recent acute changes in personality and cognition over the last several weeks likely secondary to brain Mets.  Patient confusion makes it difficult to assess any underlying depressive sx's.  She has been agitated at times and given Ativan 2 mg IM PRN and currently with 1:1 at bedside. No overt psychotic sx's were elicited. Denies any S/H I/I/P    1) Would limit benzodiazepines as may contribute to increased confusion  2) Would give Haldol 0.5 mg PO/IM Q 6hrs PRN agitation   3) Would consider need for standing medications   4) Will continue to follow for additional support

## 2018-05-21 NOTE — PROGRESS NOTE ADULT - PROBLEM SELECTOR PLAN 5
Awaiting second opinion biopsy results for oncology recommendations.  To discuss further GOC discussions thereafter.   Continued support.

## 2018-05-21 NOTE — BEHAVIORAL HEALTH ASSESSMENT NOTE - RISK ASSESSMENT
Moderate-Secondary to impulsivity, disinhibition and confusion, denies any S/H I/I/P and has no h/o suicide attempts.

## 2018-05-21 NOTE — PROGRESS NOTE ADULT - SUBJECTIVE AND OBJECTIVE BOX
CC: Follow up symptoms  Interval events- reported agitation, seen by Psych      PRESENT SYMPTOMS: SOURCE:  Patient/Family/Team    PAIN SCALE:  0 = none  1 = mild   2 = moderate  3 = severe    Pain: deneis    Dyspnea:  [ ] YES [x ] NO  Anxiety:  [x ] YES [ ] NO  Fatigue: [x ] YES [ ] NO  Nausea: [ ] YES [ x] NO  Loss of Appetite: [x ] YES [ ] NO  Other symptoms: __________    MEDICATIONS  (STANDING):  dexamethasone     Tablet 4 milliGRAM(s) Oral every 12 hours  enoxaparin Injectable 40 milliGRAM(s) SubCutaneous daily  levETIRAcetam 750 milliGRAM(s) Oral two times a day  pantoprazole    Tablet 40 milliGRAM(s) Oral before breakfast    MEDICATIONS  (PRN):  haloperidol    Injectable 0.5 milliGRAM(s) IntraMuscular every 6 hours PRN delirium agitations      Allergies    No Known Allergies    Intolerances    Karnofsky Performance Score/Palliative Performance Status Version 2:  40  %    Vital Signs Last 24 Hrs  T(C): 36.9 (21 May 2018 15:37), Max: 36.9 (20 May 2018 23:13)  T(F): 98.4 (21 May 2018 15:37), Max: 98.5 (20 May 2018 23:13)  HR: 67 (21 May 2018 15:37) (67 - 98)  BP: 104/69 (21 May 2018 15:37) (97/59 - 111/61)  BP(mean): --  RR: 18 (21 May 2018 15:37) (18 - 18)  SpO2: 96% (21 May 2018 15:37) (96% - 97%)    PHYSICAL EXAM:    General: alert confused NAD    HEENT: [x ] normal  [ ] dry mouth  [ ] ET tube/trach    Lungs: [x ] comfortable [ ] tachypnea/labored breathing  [ ] excessive secretions    CV: [ x] normal  [ ] tachycardia    GI: [ x] normal  [ ] distended  [ ] tender  [ ] no BS               [ ] PEG/NG/OG tube    : [ x] normal  [ ] incontinent  [ ] oliguria/anuria  [ ] hudson    MSK: [ ] normal  [ x] weakness  [ ] edema             [ ] ambulatory  [ ] bedbound/wheelchair bound    Skin: [ ] normal  [ ] pressure ulcers- Stage_____  [x ] no rash    LABS:                        15.0   8.0   )-----------( 224      ( 20 May 2018 10:34 )             44.8     05-20    138  |  100  |  21.0<H>  ----------------------------<  140<H>  4.1   |  24.0  |  0.51    Ca    9.4      20 May 2018 10:34          I&O's Summary    20 May 2018 07:01  -  21 May 2018 07:00  --------------------------------------------------------  IN: 0 mL / OUT: 700 mL / NET: -700 mL        Thank you for the opportunity to assist with the care of this patient.   [facility] [phone].

## 2018-05-21 NOTE — PROGRESS NOTE ADULT - SUBJECTIVE AND OBJECTIVE BOX
CC: Small cell lung cancer with brain metastatic disease.  Delirium from brain disease burden. Lower ext and right upper ext paresis.   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 Case discussed @ length with son, pt is poor historian due to being aaox2. Per son, pt recently with onset of Rt sided weakness several weeks ago prompting eval in ER @ [facility] to r/o CVA. At that time noted to have mets to the brain and on futher exam seen to have lung mass suspected to be primary lesion with mets also involving the liver. Liver bx taken, results pending. Since discharge from [facility], pt managed @ home by son but noted to have worsening complications of altered personality increased paranoia and agitation, and gait instability. Has had approx 20 falls since dx. Last fall prompting presentation to the hospital at this time given trauma to the head. Cannot assess whether LOC was present or not. Pt has become an extreme risk at home because of agitation prompting her to constantly attempt to ambulate or climb out of bed alone and has required 24hr surveillance by family. (17 May 2018 08:32)    REVIEW OF SYSTEMS:    Patient denied fever, chills, abdominal pain, nausea, vomiting, cough, shortness of breath, chest pain or palpitations    Vital Signs Last 24 Hrs  T(C): 36.3 (21 May 2018 07:24), Max: 36.9 (20 May 2018 23:13)  T(F): 97.4 (21 May 2018 07:24), Max: 98.5 (20 May 2018 23:13)  HR: 69 (21 May 2018 07:24) (68 - 98)  BP: 97/59 (21 May 2018 07:24) (97/59 - 111/61)  BP(mean): --  RR: 18 (21 May 2018 07:24) (18 - 18)  SpO2: 97% (21 May 2018 07:24) (96% - 97%)I&O's Summary    20 May 2018 07:01  -  21 May 2018 07:00  --------------------------------------------------------  IN: 0 mL / OUT: 700 mL / NET: -700 mL      PHYSICAL EXAM:  GENERAL: NAD,  HEENT: PERRL, +EOMI, anicteric, no [place]  NECK: Supple, No JVD   CHEST/LUNG: CTA bilaterally; Normal effort  HEART: S1S2 Normal intensity, no murmurs, gallops or rubs noted  ABDOMEN: Soft, BS Normoactive, NT, ND, no HSM noted  EXTREMITIES:  2+ radial and DP pulses noted, no clubbing, cyanosis, or edema noted, Limited mobility   SKIN: No rashes or lesions noted  NEURO: Delirium , right upper ext paresis. Paraparesis., CN II-XII intact  PSYCH: Depressed mood and affect; insight/judgement inappropriate  LABS:                        15.0   8.0   )-----------( 224      ( 20 May 2018 10:34 )             44.8     05-20    138  |  100  |  21.0<H>  ----------------------------<  140<H>  4.1   |  24.0  |  0.51    Ca    9.4      20 May 2018 10:34          RADIOLOGY & ADDITIONAL TESTS:    MEDICATIONS:  MEDICATIONS  (STANDING):  dexamethasone     Tablet 4 milliGRAM(s) Oral every 6 hours  enoxaparin Injectable 40 milliGRAM(s) SubCutaneous daily  levETIRAcetam 750 milliGRAM(s) Oral two times a day  pantoprazole    Tablet 40 milliGRAM(s) Oral before breakfast    MEDICATIONS  (PRN):  LORazepam   Injectable 2 milliGRAM(s) IntraMuscular every 6 hours PRN Agitation

## 2018-05-22 ENCOUNTER — APPOINTMENT (OUTPATIENT)
Dept: HEMATOLOGY ONCOLOGY | Facility: CLINIC | Age: 68
End: 2018-05-22

## 2018-05-22 VITALS
SYSTOLIC BLOOD PRESSURE: 101 MMHG | HEART RATE: 70 BPM | OXYGEN SATURATION: 97 % | HEIGHT: 62 IN | RESPIRATION RATE: 16 BRPM | WEIGHT: 136.03 LBS | BODY MASS INDEX: 25.03 KG/M2 | DIASTOLIC BLOOD PRESSURE: 66 MMHG

## 2018-05-22 LAB
ANION GAP SERPL CALC-SCNC: 14 MMOL/L — SIGNIFICANT CHANGE UP (ref 5–17)
BUN SERPL-MCNC: 23 MG/DL — HIGH (ref 8–20)
CALCIUM SERPL-MCNC: 8.7 MG/DL — SIGNIFICANT CHANGE UP (ref 8.6–10.2)
CHLORIDE SERPL-SCNC: 101 MMOL/L — SIGNIFICANT CHANGE UP (ref 98–107)
CO2 SERPL-SCNC: 23 MMOL/L — SIGNIFICANT CHANGE UP (ref 22–29)
CREAT SERPL-MCNC: 0.56 MG/DL — SIGNIFICANT CHANGE UP (ref 0.5–1.3)
GLUCOSE SERPL-MCNC: 130 MG/DL — HIGH (ref 70–115)
HCT VFR BLD CALC: 42 % — SIGNIFICANT CHANGE UP (ref 37–47)
HGB BLD-MCNC: 13.7 G/DL — SIGNIFICANT CHANGE UP (ref 12–16)
MCHC RBC-ENTMCNC: 30.9 PG — SIGNIFICANT CHANGE UP (ref 27–31)
MCHC RBC-ENTMCNC: 32.6 G/DL — SIGNIFICANT CHANGE UP (ref 32–36)
MCV RBC AUTO: 94.8 FL — SIGNIFICANT CHANGE UP (ref 81–99)
PLATELET # BLD AUTO: 201 K/UL — SIGNIFICANT CHANGE UP (ref 150–400)
POTASSIUM SERPL-MCNC: 4.1 MMOL/L — SIGNIFICANT CHANGE UP (ref 3.5–5.3)
POTASSIUM SERPL-SCNC: 4.1 MMOL/L — SIGNIFICANT CHANGE UP (ref 3.5–5.3)
RBC # BLD: 4.43 M/UL — SIGNIFICANT CHANGE UP (ref 4.4–5.2)
RBC # FLD: 15.1 % — SIGNIFICANT CHANGE UP (ref 11–15.6)
SODIUM SERPL-SCNC: 138 MMOL/L — SIGNIFICANT CHANGE UP (ref 135–145)
WBC # BLD: 7.4 K/UL — SIGNIFICANT CHANGE UP (ref 4.8–10.8)
WBC # FLD AUTO: 7.4 K/UL — SIGNIFICANT CHANGE UP (ref 4.8–10.8)

## 2018-05-22 PROCEDURE — 99232 SBSQ HOSP IP/OBS MODERATE 35: CPT

## 2018-05-22 PROCEDURE — 99233 SBSQ HOSP IP/OBS HIGH 50: CPT

## 2018-05-22 RX ADMIN — LEVETIRACETAM 750 MILLIGRAM(S): 250 TABLET, FILM COATED ORAL at 05:51

## 2018-05-22 RX ADMIN — Medication 4 MILLIGRAM(S): at 05:51

## 2018-05-22 RX ADMIN — PANTOPRAZOLE SODIUM 40 MILLIGRAM(S): 20 TABLET, DELAYED RELEASE ORAL at 05:51

## 2018-05-22 RX ADMIN — Medication 4 MILLIGRAM(S): at 18:32

## 2018-05-22 RX ADMIN — LEVETIRACETAM 750 MILLIGRAM(S): 250 TABLET, FILM COATED ORAL at 18:32

## 2018-05-22 RX ADMIN — ENOXAPARIN SODIUM 40 MILLIGRAM(S): 100 INJECTION SUBCUTANEOUS at 18:32

## 2018-05-22 NOTE — PROGRESS NOTE BEHAVIORAL HEALTH - NSBHCHARTREVIEWLAB_PSY_A_CORE FT
13.7   7.4   )-----------( 201      ( 22 May 2018 09:09 )             42.0     05-22    138  |  101  |  23.0<H>  ----------------------------<  130<H>  4.1   |  23.0  |  0.56    Ca    8.7      22 May 2018 09:09

## 2018-05-22 NOTE — PROGRESS NOTE ADULT - ASSESSMENT
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     Metastatic Lung Ca- Currently undergoing radiation therapy at [facility] and receiving decadron.   Liver bx from [facility] showing high grade Ca w/ neuroendocrine features despite neuroendocrine serum markers being neg per Onc, pending sec opinion on path. noted to have mets to lymph nodes + brain + liver., noted to have supra + infratentorial brain mets containing bl Lt temporal horn effacement complicated by mass effect causing subfalcine herniation as per CTH. complicated by severe incoordination, RUE weakness, cognitive impairment, + intermittent receptive aphasia.   PLAN. decadon . radiation therapy to brain per Dr Mondragon, will need 10 total tx, to be transferred daily.   Palliative consult appreciated, pending family meeting. neurosx consult appreciated, poor prognosis, no sx intervention offered @ this time. Onc consult appreciated, will fu (seen by [facility] group). pt family cannot care for her needs @ this time given advanced disease with complications, likely transfer to [facility] or acute rehab, overall good candidate for hospice. will need to clarify with CW if acute rehab accepts pt on radiation therapy.     Gait instability - complicated by frequent falls. most recent fall just prior to admit to Rt frontoparietal head. likely sec to brain mets causing gait instability. PT eval appreciated, rec quad cane w/ various dispo options including home w/ assist, [facility], home with 24/7 care, acute rehab. PLAN. PMR. fall precautions.     Delirium with personality changes- case discussed with son, pt with inc agitation + un-cooperative+ onset of angry ep, per family this is significant change of personality. suspect sec to brain mets. PLAN. cont family counseling. redirection when needed. a cont 1:1 for impulsivity.   Supportive care. Haldol prn per psychiatric consult for agitations .     Leukocytosis- mild. no signs active infection. UA neg. most likely sec to malingnacy. PLAN. no further intervention, no need to repeat cbc unless acute clinical need . May be due to high dose steroid.     Disposition: supportive care. Palliative care.  Brain irradiation palliative therapy. Symptoms control . Awaiting [facility] transfer.

## 2018-05-22 NOTE — PROGRESS NOTE ADULT - SUBJECTIVE AND OBJECTIVE BOX
CC: Follow up symptoms and GOC  INTERVAL HPI/OVERNIGHT EVENTS:  no overnight events noted  remains on 1:1  PRESENT SYMPTOMS: SOURCE:  Patient/Family/Team    PAIN SCALE:  0 = none  1 = mild   2 = moderate  3 = severe    Pain: denies    Dyspnea:  [ ] YES [x ] NO  Anxiety:  [ ] YES [x ] NO  Fatigue: [ ]x YES [ ] NO  Nausea: [ ] YES [x ] NO  Loss of Appetite: [ x] YES [ ] NO  Other symptoms: __________    MEDICATIONS  (STANDING):  dexamethasone     Tablet 4 milliGRAM(s) Oral every 12 hours  enoxaparin Injectable 40 milliGRAM(s) SubCutaneous daily  levETIRAcetam 750 milliGRAM(s) Oral two times a day  pantoprazole    Tablet 40 milliGRAM(s) Oral before breakfast    MEDICATIONS  (PRN):  haloperidol    Injectable 0.5 milliGRAM(s) IntraMuscular every 6 hours PRN delirium agitations      Allergies    No Known Allergies    Intolerances     Karnofsky Performance Score/Palliative Performance Status Version 2: 30 %    Vital Signs Last 24 Hrs  T(C): 37 (22 May 2018 10:45), Max: 37 (22 May 2018 10:45)  T(F): 98.6 (22 May 2018 10:45), Max: 98.6 (22 May 2018 10:45)  HR: 64 (22 May 2018 10:45) (64 - 74)  BP: 114/73 (22 May 2018 10:45) (104/69 - 121/68)  BP(mean): --  RR: 18 (22 May 2018 10:45) (18 - 18)  SpO2: 98% (22 May 2018 10:45) (96% - 98%)    PHYSICAL EXAM:    General:  AOx1 NAD  HEENT: [ ] normal  [ ] dry mouth  [ ] ET tube/trach    Lungs: [x ] comfortable [ ] tachypnea/labored breathing  [ ] excessive secretions    CV: [ x] normal  [ ] tachycardia    GI: [ x] normal  [ ] distended  [ ] tender  [ ] no BS               [ ] PEG/NG/OG tube    : [x ] normal  [ ] incontinent  [ ] oliguria/anuria  [ ] hudson    MSK: [ ] normal  [ x] weakness  [ ] edema             [ ] ambulatory  [ ] bedbound/wheelchair bound    Skin: [ ] normal  [ ] pressure ulcers- Stage_____  [x ] no rash    LABS:                        13.7   7.4   )-----------( 201      ( 22 May 2018 09:09 )             42.0     05-22    138  |  101  |  23.0<H>  ----------------------------<  130<H>  4.1   |  23.0  |  0.56    Ca    8.7      22 May 2018 09:09      Thank you for the opportunity to assist with the care of this patient.   [facility] [phone].

## 2018-05-22 NOTE — PROGRESS NOTE ADULT - SUBJECTIVE AND OBJECTIVE BOX
CC: Lung cancer with brain metastatic disease. Brain irradiation therapy ongoing .Delirium from cancer disease burden.   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 Case discussed @ length with son, pt is poor historian due to being aaox2. Per son, pt recently with onset of Rt sided weakness several weeks ago prompting eval in ER @ [facility] to r/o CVA. At that time noted to have mets to the brain and on futher exam seen to have lung mass suspected to be primary lesion with mets also involving the liver. Liver bx taken, results pending. Since discharge from [facility], pt managed @ home by son but noted to have worsening complications of altered personality increased paranoia and agitation, and gait instability. Has had approx 20 falls since dx. Last fall prompting presentation to the hospital at this time given trauma to the head. Cannot assess whether LOC was present or not. Pt has become an extreme risk at home because of agitation prompting her to constantly attempt to ambulate or climb out of bed alone and has required 24hr surveillance by family. (17 May 2018 08:32)    REVIEW OF SYSTEMS:    Patient denied fever, chills, abdominal pain, nausea, vomiting, cough, shortness of breath, chest pain or palpitations    Vital Signs Last 24 Hrs  T(C): 37 (22 May 2018 10:45), Max: 37 (22 May 2018 10:45)  T(F): 98.6 (22 May 2018 10:45), Max: 98.6 (22 May 2018 10:45)  HR: 64 (22 May 2018 10:45) (64 - 74)  BP: 114/73 (22 May 2018 10:45) (104/69 - 121/68)  BP(mean): --  RR: 18 (22 May 2018 10:45) (18 - 18)  SpO2: 98% (22 May 2018 10:45) (96% - 98%)I&O's Summary    PHYSICAL EXAM:  GENERAL: NAD,   HEENT: PERRL, +EOMI, anicteric, no [place]  NECK: Supple, No JVD   CHEST/LUNG: CTA bilaterally; Normal effort  HEART: S1S2 Normal intensity, no murmurs, gallops or rubs noted  ABDOMEN: Soft, BS Normoactive, NT, ND, no HSM noted  EXTREMITIES:  2+ radial and DP pulses noted, no clubbing, cyanosis, or edema noted, Limited mobility   SKIN: No rashes or lesions noted  NEURO: Confused, Right hemiparesis.  CN II-XII intact  PSYCH: Depressed  mood and affect; insight/judgement appropriate  LABS:                        13.7   7.4   )-----------( 201      ( 22 May 2018 09:09 )             42.0     05-22    138  |  101  |  23.0<H>  ----------------------------<  130<H>  4.1   |  23.0  |  0.56    Ca    8.7      22 May 2018 09:09          RADIOLOGY & ADDITIONAL TESTS:    MEDICATIONS:  MEDICATIONS  (STANDING):  dexamethasone     Tablet 4 milliGRAM(s) Oral every 12 hours  enoxaparin Injectable 40 milliGRAM(s) SubCutaneous daily  levETIRAcetam 750 milliGRAM(s) Oral two times a day  pantoprazole    Tablet 40 milliGRAM(s) Oral before breakfast    MEDICATIONS  (PRN):  haloperidol    Injectable 0.5 milliGRAM(s) IntraMuscular every 6 hours PRN delirium agitations

## 2018-05-22 NOTE — PROGRESS NOTE BEHAVIORAL HEALTH - NSBHCHARTREVIEWVS_PSY_A_CORE FT
Vital Signs Last 24 Hrs  T(C): 37 (22 May 2018 10:45), Max: 37 (22 May 2018 10:45)  T(F): 98.6 (22 May 2018 10:45), Max: 98.6 (22 May 2018 10:45)  HR: 64 (22 May 2018 10:45) (64 - 74)  BP: 114/73 (22 May 2018 10:45) (104/69 - 121/68)  BP(mean): --  RR: 18 (22 May 2018 10:45) (18 - 18)  SpO2: 98% (22 May 2018 10:45) (96% - 98%)

## 2018-05-23 VITALS
HEIGHT: 62 IN | DIASTOLIC BLOOD PRESSURE: 71 MMHG | HEART RATE: 78 BPM | BODY MASS INDEX: 25.03 KG/M2 | OXYGEN SATURATION: 97 % | SYSTOLIC BLOOD PRESSURE: 118 MMHG | WEIGHT: 136 LBS | RESPIRATION RATE: 16 BRPM

## 2018-05-23 PROCEDURE — 99233 SBSQ HOSP IP/OBS HIGH 50: CPT

## 2018-05-23 PROCEDURE — 77427 RADIATION TX MANAGEMENT X5: CPT

## 2018-05-23 PROCEDURE — 99232 SBSQ HOSP IP/OBS MODERATE 35: CPT

## 2018-05-23 RX ADMIN — LEVETIRACETAM 750 MILLIGRAM(S): 250 TABLET, FILM COATED ORAL at 05:39

## 2018-05-23 RX ADMIN — HALOPERIDOL DECANOATE 0.5 MILLIGRAM(S): 100 INJECTION INTRAMUSCULAR at 21:53

## 2018-05-23 RX ADMIN — LEVETIRACETAM 750 MILLIGRAM(S): 250 TABLET, FILM COATED ORAL at 17:49

## 2018-05-23 RX ADMIN — Medication 4 MILLIGRAM(S): at 05:39

## 2018-05-23 RX ADMIN — PANTOPRAZOLE SODIUM 40 MILLIGRAM(S): 20 TABLET, DELAYED RELEASE ORAL at 05:39

## 2018-05-23 RX ADMIN — ENOXAPARIN SODIUM 40 MILLIGRAM(S): 100 INJECTION SUBCUTANEOUS at 17:49

## 2018-05-23 RX ADMIN — Medication 4 MILLIGRAM(S): at 17:49

## 2018-05-23 NOTE — PROGRESS NOTE ADULT - PROBLEM SELECTOR PLAN 3
Psych eval- placed on Haldol 0.5mg PRN.  Continues to 1:1
Assist with ADls, ambulation
Assist with ADls, ambulation.
Haldol prn

## 2018-05-23 NOTE — PROGRESS NOTE ADULT - ASSESSMENT
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     Metastatic Lung Ca- Currently undergoing radiation therapy at [facility] and receiving decadron.   Liver bx from [facility] showing high grade Ca w/ neuroendocrine features despite neuroendocrine serum markers being neg per Onc, pending sec opinion on path. noted to have mets to lymph nodes + brain + liver., noted to have supra + infratentorial brain mets containing bl Lt temporal horn effacement complicated by mass effect causing subfalcine herniation as per CTH. complicated by severe incoordination, RUE weakness, cognitive impairment, + intermittent receptive aphasia.   PLAN. decadon 4mg po bid  Keppra 750mg po bid for seizure   Radiation therapy  to brain per Dr Mondragon, will need 10 total tx, to be transferred daily.   Palliative consult appreciated, pending family meeting. neurosx consult appreciated, poor prognosis, no sx intervention offered @ this time. Onc consult appreciated, will fu (seen by [facility] group). pt family cannot care for her needs @ this time given advanced disease with complications, likely transfer to [facility] or acute rehab, overall good candidate for hospice. will need to clarify with CW if acute rehab accepts pt on radiation therapy.     Gait instability - complicated by frequent falls. most recent fall just prior to admit to Rt frontoparietal head. likely sec to brain mets causing gait instability. PT eval appreciated, rec quad cane w/ various dispo options including home w/ assist, [facility], home with 24/7 care, acute rehab. PLAN. PMR. fall precautions.     Delirium with personality changes- case discussed with son, pt with inc agitation + un-cooperative+ onset of angry ep, per family this is significant change of personality. suspect sec to brain mets. PLAN. cont family counseling. redirection when needed. a cont 1:1 for impulsivity.   Supportive care. Haldol prn per psychiatric consult for agitations .     Leukocytosis- mild. no signs active infection. UA neg. most likely sec to malingnacy. PLAN. no further intervention, no need to repeat cbc unless acute clinical need . May be due to high dose steroid.     Disposition: supportive care. Palliative care.  Brain irradiation palliative therapy. Symptoms control . Awaiting [facility] transfer.

## 2018-05-23 NOTE — PROGRESS NOTE ADULT - PROBLEM SELECTOR PLAN 2
Decadron to 4mg q12.
Cont Decadron to 4mg q12.
Receiving radiation  Change Decadron to 4mg q12
Receiving radiation.   Continue Decadron 4mg IVP q 6hr. Change to po

## 2018-05-23 NOTE — PROGRESS NOTE ADULT - SUBJECTIVE AND OBJECTIVE BOX
CC: Lung cancer with brain metastatic disease. Brain radiation therapy.   Confusion and delirium. Mobility limitations.   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 Case discussed @ length with son, pt is poor historian due to being aaox2. Per son, pt recently with onset of Rt sided weakness several weeks ago prompting eval in ER @ [facility] to r/o CVA. At that time noted to have mets to the brain and on futher exam seen to have lung mass suspected to be primary lesion with mets also involving the liver. Liver bx taken, results pending. Since discharge from [facility], pt managed @ home by son but noted to have worsening complications of altered personality increased paranoia and agitation, and gait instability. Has had approx 20 falls since dx. Last fall prompting presentation to the hospital at this time given trauma to the head. Cannot assess whether LOC was present or not. Pt has become an extreme risk at home because of agitation prompting her to constantly attempt to ambulate or climb out of bed alone and has required 24hr surveillance by family. (17 May 2018 08:32)    REVIEW OF SYSTEMS:    Patient denied fever, chills, abdominal pain, nausea, vomiting, cough, shortness of breath, chest pain or palpitations    Vital Signs Last 24 Hrs  T(C): 36.4 (22 May 2018 23:24), Max: 37 (22 May 2018 10:45)  T(F): 97.5 (22 May 2018 23:24), Max: 98.6 (22 May 2018 10:45)  HR: 69 (22 May 2018 23:24) (64 - 73)  BP: 123/67 (22 May 2018 23:24) (110/66 - 123/67)  BP(mean): --  RR: 17 (22 May 2018 23:24) (17 - 18)  SpO2: 98% (22 May 2018 23:24) (98% - 100%)I&O's Summary    PHYSICAL EXAM:  GENERAL: NAD,   HEENT: PERRL, +EOMI, anicteric, no [place]  NECK: Supple, No JVD   CHEST/LUNG: CTA bilaterally; Normal effort  HEART: S1S2 Normal intensity, no murmurs, gallops or rubs noted  ABDOMEN: Soft, BS Normoactive, NT, ND, no HSM noted  EXTREMITIES:  2+ radial and DP pulses noted, no clubbing, cyanosis, or edema noted, Limited mobility   SKIN: No rashes or lesions noted  NEURO: A&O, Right upper ext paresis. Paraparesis.  CN II-XII intact  PSYCH: Depressed mood and affect; insight/judgement inappropriate  LABS:                        13.7   7.4   )-----------( 201      ( 22 May 2018 09:09 )             42.0     05-22    138  |  101  |  23.0<H>  ----------------------------<  130<H>  4.1   |  23.0  |  0.56    Ca    8.7      22 May 2018 09:09          RADIOLOGY & ADDITIONAL TESTS:    MEDICATIONS:  MEDICATIONS  (STANDING):  dexamethasone     Tablet 4 milliGRAM(s) Oral every 12 hours  enoxaparin Injectable 40 milliGRAM(s) SubCutaneous daily  levETIRAcetam 750 milliGRAM(s) Oral two times a day  pantoprazole    Tablet 40 milliGRAM(s) Oral before breakfast    MEDICATIONS  (PRN):  haloperidol    Injectable 0.5 milliGRAM(s) IntraMuscular every 6 hours PRN delirium agitations

## 2018-05-23 NOTE — PROGRESS NOTE ADULT - SUBJECTIVE AND OBJECTIVE BOX
CC:  follow up symptoms and GOC  INTERVAL HPI/OVERNIGHT EVENTS:  none  PRESENT SYMPTOMS: SOURCE:  Patient/Family/Team    PAIN SCALE:  0 = none  1 = mild   2 = moderate  3 = severe    Pain: 0    Dyspnea:  [ ] YES [ x] NO  Anxiety:  [ ] YES [ x] NO  Fatigue: [ ] YES [x ] NO  Nausea: [ ] YES [ x] NO  Loss of Appetite: [ ] YES [x ] NO  Other symptoms: __________    MEDICATIONS  (STANDING):  dexamethasone     Tablet 4 milliGRAM(s) Oral every 12 hours  enoxaparin Injectable 40 milliGRAM(s) SubCutaneous daily  levETIRAcetam 750 milliGRAM(s) Oral two times a day  pantoprazole    Tablet 40 milliGRAM(s) Oral before breakfast    MEDICATIONS  (PRN):  haloperidol    Injectable 0.5 milliGRAM(s) IntraMuscular every 6 hours PRN delirium agitations      Allergies    No Known Allergies    Intolerances  Karnofsky Performance Score/Palliative Performance Status Version 2:  30       %    Vital Signs Last 24 Hrs  T(C): 37.1 (23 May 2018 12:00), Max: 37.1 (23 May 2018 12:00)  T(F): 98.7 (23 May 2018 12:00), Max: 98.7 (23 May 2018 12:00)  HR: 68 (23 May 2018 12:00) (68 - 75)  BP: 116/74 (23 May 2018 12:00) (100/60 - 123/67)  BP(mean): --  RR: 18 (23 May 2018 12:00) (17 - 18)  SpO2: 97% (23 May 2018 12:00) (97% - 98%)    PHYSICAL EXAM:    General:  AOx2, NAD    HEENT: [ x] normal  [ ] dry mouth  [ ] ET tube/trach    Lungs: [x ] comfortable [ ] tachypnea/labored breathing  [ ] excessive secretions    CV: [x ] normal  [ ] tachycardia    GI: [ x] normal  [ ] distended  [ ] tender  [ ] no BS               [ ] PEG/NG/OG tube    : [ x] normal  [ ] incontinent  [ ] oliguria/anuria  [ ] hudson    MSK: [ ] normal  [x ] weakness  [ ] edema             [ ] ambulatory  [ ] bedbound/wheelchair bound    Skin: [ ] normal  [ ] pressure ulcers- Stage_____  [ x] no rash    LABS:                        13.7   7.4   )-----------( 201      ( 22 May 2018 09:09 )             42.0     05-22    138  |  101  |  23.0<H>  ----------------------------<  130<H>  4.1   |  23.0  |  0.56    Ca    8.7      22 May 2018 09:09      Thank you for the opportunity to assist with the care of this patient.   [facility] [phone].

## 2018-05-23 NOTE — PROGRESS NOTE ADULT - PROBLEM SELECTOR PLAN 4
Awaiting second opinion biopsy results for oncology recommendations.  To discuss further GOC discussions thereafter.
Awaiting second opinion biopsy results for oncology recommendations.  GOC discussion thereafter
Met with daughter with Dr. Torres.  Discussed current condition. She is awaiting final biopsy results to see what treatments are available.  She states was initially told 6 lesions in brain when first diagnosed, but later told by Dr. Alanna Carlson. Explained aggressive nature of disease. She is hoping mother can have some improvement in her condition  Patient to cont radiation.  Await further treatment recommendations by oncology once second opinion biopsy return. Thereafter, can discuss options for care. Further goals of care discussions planned.
Psych eval- placed on Haldol 0.5mg PRN

## 2018-05-23 NOTE — PROGRESS NOTE ADULT - PROBLEM SELECTOR PLAN 1
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  Biopsy has been sent for second opinion.
[facility] Metastatic high grade carcinoma morphologically suggestive of high grade carcinoma w/ neuroendocrine features however neuroendocrine markers are negative.  Biopsy has been sent for second opinion
Liver biopsy results from [facility]: Metastatic high grade carcinoma morphologically suggestive of high grade carcinoma w/ neuroendocrine features however neuroendocrine markers are negative.  Biopsy has been sent for second opinion.
Liver biopsy results from [facility]: Metastatic high grade carcinoma morphologically suggestive of high grade carcinoma w/ neuroendocrine features however neuroendocrine markers are negative.  Biopsy has been sent for second opinion.

## 2018-05-24 VITALS
OXYGEN SATURATION: 98 % | DIASTOLIC BLOOD PRESSURE: 66 MMHG | WEIGHT: 136 LBS | HEART RATE: 68 BPM | BODY MASS INDEX: 25.03 KG/M2 | SYSTOLIC BLOOD PRESSURE: 102 MMHG | RESPIRATION RATE: 16 BRPM | HEIGHT: 62 IN

## 2018-05-24 PROCEDURE — 99233 SBSQ HOSP IP/OBS HIGH 50: CPT

## 2018-05-24 PROCEDURE — 99232 SBSQ HOSP IP/OBS MODERATE 35: CPT

## 2018-05-24 RX ADMIN — ENOXAPARIN SODIUM 40 MILLIGRAM(S): 100 INJECTION SUBCUTANEOUS at 16:23

## 2018-05-24 RX ADMIN — Medication 4 MILLIGRAM(S): at 16:23

## 2018-05-24 RX ADMIN — LEVETIRACETAM 750 MILLIGRAM(S): 250 TABLET, FILM COATED ORAL at 04:46

## 2018-05-24 RX ADMIN — Medication 4 MILLIGRAM(S): at 04:45

## 2018-05-24 RX ADMIN — LEVETIRACETAM 750 MILLIGRAM(S): 250 TABLET, FILM COATED ORAL at 16:23

## 2018-05-24 RX ADMIN — PANTOPRAZOLE SODIUM 40 MILLIGRAM(S): 20 TABLET, DELAYED RELEASE ORAL at 04:46

## 2018-05-24 NOTE — PROGRESS NOTE BEHAVIORAL HEALTH - NSBHATTESTSEENBY_PSY_A_CORE
NP with telephonic supervision from Attending Psychiatrist
attending Psychiatrist without NP/Trainee
attending Psychiatrist without NP/Trainee

## 2018-05-24 NOTE — PROGRESS NOTE BEHAVIORAL HEALTH - SUMMARY
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  Patient has had change in cognition and personality in the last few weeks.  Asked to evaluate for possible depression.  Patient with recent acute changes in personality and cognition over the last several weeks likely secondary to brain Mets.  Patient confusion makes it difficult to assess any underlying depressive sx's.   She has not been agitated recently. No 1:1 at bedside.    No overt psychotic sx's were elicited. Denies any S/H I/I/P    1) Would limit benzodiazepines as may contribute to increased confusion  2) Would cont Haldol 0.5 mg PO/IM Q 6hrs PRN agitation   3) Would consider need for standing medications   4) Will continue to follow for additional support  5) Patient should be followed by psychiatry at [facility]
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  Patient has had change in cognition and personality in the last few weeks.  Asked to evaluate for possible depression.  Patient with recent acute changes in personality and cognition over the last several weeks likely secondary to brain Mets.  Patient confusion makes it difficult to assess any underlying depressive sx's.  She has been agitated at times and given Ativan 2 mg IM PRN and currently with 1:1 at bedside. No overt psychotic sx's were elicited. Denies any S/H I/I/P    1) Would limit benzodiazepines as may contribute to increased confusion  2) Would cont Haldol 0.5 mg PO/IM Q 6hrs PRN agitation   3) Would consider need for standing medications   4) Will continue to follow for additional support
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  Patient has had change in cognition and personality in the last few weeks.  Asked to evaluate for possible depression.  Patient with recent acute changes in personality and cognition over the last several weeks likely secondary to brain Mets.  Patient confusion makes it difficult to assess any underlying depressive sx's.   She has not been agitated recently. No 1:1 at bedside.  She has not required any PRN over last several days.   No overt psychotic sx's were elicited. Denies any S/H I/I/P    1) Would limit benzodiazepines as may contribute to increased confusion  2) Would cont Haldol 0.5 mg PO/IM Q 6hrs PRN agitation   3) Would consider need for standing medications   4) Will continue to follow for additional support

## 2018-05-24 NOTE — PROGRESS NOTE ADULT - PROVIDER SPECIALTY LIST ADULT
Heme/Onc
Hospitalist
Palliative Care
Hospitalist
Hospitalist
Palliative Care

## 2018-05-24 NOTE — DIETITIAN INITIAL EVALUATION ADULT. - NUTRITIONGOAL OUTCOME1
Pt will continue to consume >50% of meals to maintain nutritional status & prevent muscle breakdown Pt will continue to consume >50% of meals and supplement 1-3x daily to maintain nutritional status

## 2018-05-24 NOTE — PROGRESS NOTE BEHAVIORAL HEALTH - NSBHCONSULTMEDAGITATION_PSY_A_CORE FT
Haldol 0.5 mg PO/IM/IV Q 6hrs PRN agitation

## 2018-05-24 NOTE — PROGRESS NOTE ADULT - SUBJECTIVE AND OBJECTIVE BOX
CC: Brain metastatic disease from lung cancer .  No agitations.   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 Case discussed @ length with son, pt is poor historian due to being aaox2. Per son, pt recently with onset of Rt sided weakness several weeks ago prompting eval in ER @ [facility] to r/o CVA. At that time noted to have mets to the brain and on futher exam seen to have lung mass suspected to be primary lesion with mets also involving the liver. Liver bx taken, results pending. Since discharge from [facility], pt managed @ home by son but noted to have worsening complications of altered personality increased paranoia and agitation, and gait instability. Has had approx 20 falls since dx. Last fall prompting presentation to the hospital at this time given trauma to the head. Cannot assess whether LOC was present or not. Pt has become an extreme risk at home because of agitation prompting her to constantly attempt to ambulate or climb out of bed alone and has required 24hr surveillance by family. (17 May 2018 08:32)    REVIEW OF SYSTEMS:    Patient denied fever, chills, abdominal pain, nausea, vomiting, cough, shortness of breath, chest pain or palpitations    Vital Signs Last 24 Hrs  T(C): 36.7 (24 May 2018 08:18), Max: 37.1 (23 May 2018 23:17)  T(F): 98.1 (24 May 2018 08:18), Max: 98.8 (23 May 2018 23:17)  HR: 71 (24 May 2018 08:18) (71 - 90)  BP: 111/61 (24 May 2018 08:18) (109/62 - 124/81)  BP(mean): --  RR: 18 (24 May 2018 08:18) (18 - 20)  SpO2: 97% (24 May 2018 08:18) (96% - 97%)I&O's Summary    PHYSICAL EXAM:  GENERAL: NAD,   HEENT: PERRL, +EOMI, anicteric, no [place]  NECK: Supple, No JVD   CHEST/LUNG: CTA bilaterally; Normal effort  HEART: S1S2 Normal intensity, no murmurs, gallops or rubs noted  ABDOMEN: Soft, BS Normoactive, NT, ND, no HSM noted  EXTREMITIES:  2+ radial and DP pulses noted, no clubbing, cyanosis, or edema noted, Limited mobility   SKIN: No rashes or lesions noted  NEURO: A&O, no focal deficits noted, CN II-XII intact  PSYCH: Depressed mood and affect; insight/judgement inappropriate  LABS:              RADIOLOGY & ADDITIONAL TESTS:    MEDICATIONS:  MEDICATIONS  (STANDING):  dexamethasone     Tablet 4 milliGRAM(s) Oral every 12 hours  enoxaparin Injectable 40 milliGRAM(s) SubCutaneous daily  levETIRAcetam 750 milliGRAM(s) Oral two times a day  pantoprazole    Tablet 40 milliGRAM(s) Oral before breakfast    MEDICATIONS  (PRN):  haloperidol    Injectable 0.5 milliGRAM(s) IntraMuscular every 6 hours PRN delirium agitations

## 2018-05-24 NOTE — PROGRESS NOTE ADULT - ASSESSMENT
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     Metastatic Lung Ca- Currently undergoing radiation therapy at [facility] and receiving decadron.   Liver bx from [facility] showing high grade Ca w/ neuroendocrine features despite neuroendocrine serum markers being neg per Onc, pending sec opinion on path. noted to have mets to lymph nodes + brain + liver., noted to have supra + infratentorial brain mets containing bl Lt temporal horn effacement complicated by mass effect causing subfalcine herniation as per CTH. complicated by severe incoordination, RUE weakness, cognitive impairment, + intermittent receptive aphasia.   PLAN. decadon 4mg po bid  Keppra 750mg po bid for seizure   Radiation therapy  to brain per Dr Mondragon, will need 10 total tx, to be transferred daily.   Palliative consult appreciated, pending family meeting. neurosx consult appreciated, poor prognosis, no sx intervention offered @ this time. Onc consult appreciated, will fu (seen by [facility] group). pt family cannot care for her needs @ this time given advanced disease with complications,  [facility] or acute rehab, overall good candidate for hospice.     Gait instability - complicated by frequent falls. most recent fall just prior to admit to Rt frontoparietal head. likely sec to brain mets causing gait instability. PT eval appreciated, rec quad cane w/ various dispo options including home w/ assist, [facility], home with 24/7 care, acute rehab. PLAN. PMR. fall precautions.     Delirium with personality changes- case discussed with son, pt with inc agitation + un-cooperative+ onset of angry ep, per family this is significant change of personality. suspect sec to brain mets. PLAN. cont family counseling. redirection when needed. a cont 1:1 for impulsivity.   Supportive care. Haldol prn per psychiatric consult for agitations .     Leukocytosis- mild. no signs active infection. UA neg. most likely sec to malingnacy. PLAN. no further intervention, no need to repeat cbc unless acute clinical need . May be due to high dose steroid.     Disposition: supportive care. Palliative care.  Brain irradiation palliative therapy. Symptoms control . Awaiting [facility] transfer.

## 2018-05-24 NOTE — DIETITIAN INITIAL EVALUATION ADULT. - ETIOLOGY
related to physiological demand in setting of mets cancer to liver, brain, and lymph related to physiological demand in setting lung ca mets to liver, brain, and lymph

## 2018-05-24 NOTE — DIETITIAN INITIAL EVALUATION ADULT. - OTHER INFO
Pt admitted for malignant neoplasm metastatic to brain. PTA pt reports appetite as good. States she is eating more in hospital than at home. Noted documentation that states pt has a decreased po intake, while pt states otherwise. Follows a regular diet at home with no restrictions. No significant wt changes over the past 6 mo. Denies N/V/D, swallowing and chewing.

## 2018-05-24 NOTE — PROGRESS NOTE BEHAVIORAL HEALTH - NSBHFUPINTERVALHXFT_PSY_A_CORE
She exhibited poor eye contact and attention. Patient was mostly non verbal and did not answer most question.  She continued to stare at television.  She denied that she has any problems.  She did state she wanted to go home.  She has not been agitated or aggressive.  She has been compliant with medications.  No PRNs were required.
Patient was smiling. She was minimally verbal. She knew she was in the hospital but could not name the hospital.  She believed the year was 1971.  She reports she is going home tomorrow.  She denies any problems.  When asked what she liked to do she stated "I like to drink", but did not elaborate further.  She has been eating. No reports of aggression or agitation. She has been compliant with treatment. She has not required any PRNs.
Patient received prn Haldol last evening. She has been calm and cooperative today. She is A&Ox1. She is aware she is in the hospital but is not able to state name. Patient denies S/H/I/I/P, A/V/H and does not appear to be responding to internal stimuli. Patient is able to identify daughter at bedside. Plan is to discharge to [facility] when medically cleared.

## 2018-05-25 ENCOUNTER — TRANSCRIPTION ENCOUNTER (OUTPATIENT)
Age: 68
End: 2018-05-25

## 2018-05-25 VITALS
SYSTOLIC BLOOD PRESSURE: 115 MMHG | TEMPERATURE: 98 F | DIASTOLIC BLOOD PRESSURE: 70 MMHG | RESPIRATION RATE: 16 BRPM | HEART RATE: 60 BPM | OXYGEN SATURATION: 98 %

## 2018-05-25 PROCEDURE — 97167 OT EVAL HIGH COMPLEX 60 MIN: CPT

## 2018-05-25 PROCEDURE — 36415 COLL VENOUS BLD VENIPUNCTURE: CPT

## 2018-05-25 PROCEDURE — 97163 PT EVAL HIGH COMPLEX 45 MIN: CPT

## 2018-05-25 PROCEDURE — 85730 THROMBOPLASTIN TIME PARTIAL: CPT

## 2018-05-25 PROCEDURE — 97112 NEUROMUSCULAR REEDUCATION: CPT

## 2018-05-25 PROCEDURE — 80048 BASIC METABOLIC PNL TOTAL CA: CPT

## 2018-05-25 PROCEDURE — 99239 HOSP IP/OBS DSCHRG MGMT >30: CPT

## 2018-05-25 PROCEDURE — 80053 COMPREHEN METABOLIC PANEL: CPT

## 2018-05-25 PROCEDURE — 96374 THER/PROPH/DIAG INJ IV PUSH: CPT

## 2018-05-25 PROCEDURE — 97110 THERAPEUTIC EXERCISES: CPT

## 2018-05-25 PROCEDURE — 97530 THERAPEUTIC ACTIVITIES: CPT

## 2018-05-25 PROCEDURE — 99285 EMERGENCY DEPT VISIT HI MDM: CPT | Mod: 25

## 2018-05-25 PROCEDURE — 85610 PROTHROMBIN TIME: CPT

## 2018-05-25 PROCEDURE — 70450 CT HEAD/BRAIN W/O DYE: CPT

## 2018-05-25 PROCEDURE — 85027 COMPLETE CBC AUTOMATED: CPT

## 2018-05-25 PROCEDURE — 81001 URINALYSIS AUTO W/SCOPE: CPT

## 2018-05-25 RX ORDER — DEXAMETHASONE 0.5 MG/5ML
1 ELIXIR ORAL
Qty: 0 | Refills: 0 | COMMUNITY
Start: 2018-05-25

## 2018-05-25 RX ORDER — LEVETIRACETAM 250 MG/1
1 TABLET, FILM COATED ORAL
Qty: 0 | Refills: 0 | COMMUNITY
Start: 2018-05-25

## 2018-05-25 RX ORDER — RISPERIDONE 4 MG/1
1 TABLET ORAL
Qty: 60 | Refills: 0 | OUTPATIENT
Start: 2018-05-25 | End: 2018-06-23

## 2018-05-25 RX ADMIN — PANTOPRAZOLE SODIUM 40 MILLIGRAM(S): 20 TABLET, DELAYED RELEASE ORAL at 05:24

## 2018-05-25 RX ADMIN — Medication 4 MILLIGRAM(S): at 05:24

## 2018-05-25 RX ADMIN — LEVETIRACETAM 750 MILLIGRAM(S): 250 TABLET, FILM COATED ORAL at 05:24

## 2018-05-25 NOTE — DISCHARGE NOTE ADULT - MEDICATION SUMMARY - MEDICATIONS TO TAKE
I will START or STAY ON the medications listed below when I get home from the hospital:    dexamethasone 4 mg oral tablet  -- 1 tab(s) by mouth every 12 hours  -- Indication: For Brain metastases    levETIRAcetam 750 mg oral tablet  -- 1 tab(s) by mouth 2 times a day  -- Indication: For Malignant neoplasm metastatic to brain    RisperDAL 0.5 mg oral tablet  -- 1 tab(s) by mouth 2 times a day   -- It is very important that you take or use this exactly as directed.  Do not skip doses or discontinue unless directed by your doctor.  May cause drowsiness.  Alcohol may intensify this effect.  Use care when operating dangerous machinery.  Obtain medical advice before taking any non-prescription drugs as some may affect the action of this medication.    -- Indication: For Agitation

## 2018-05-25 NOTE — DISCHARGE NOTE ADULT - PATIENT PORTAL LINK FT
You can access the [facility] Patient Portal, offered by [facility], by registering with the following website: [email or web address]

## 2018-05-25 NOTE — DISCHARGE NOTE ADULT - HOSPITAL COURSE
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     Metastatic Lung Ca- Currently undergoing radiation therapy at [facility] and receiving decadron.   Liver bx from [facility] showing high grade Ca w/ neuroendocrine features despite neuroendocrine serum markers being neg per Onc, pending sec opinion on path. noted to have mets to lymph nodes + brain + liver., noted to have supra + infratentorial brain mets containing bl Lt temporal horn effacement complicated by mass effect causing subfalcine herniation as per CTH. complicated by severe incoordination, RUE weakness, cognitive impairment, + intermittent receptive aphasia.   PLAN. decadon 4mg po bid  Keppra 750mg po bid for seizure   Radiation therapy  to brain per Dr Mondragon, will need 10 total treatment.  [facility]OS[facility]      Gait instability - complicated by frequent falls.  Secondary to brain mets causing gait instability.,  JOSUE,  fall precautions.     Delirium with personality changes- case discussed with son, pt with inc agitation + un-cooperative+ onset of angry episodes.  Secondary to  brain mets. PLAN. cont family counseling. Redirection when needed. Supportive care. Risperdal 0.5mg po bid . Haldol injections prn for severe agitations .     Leukocytosis- mild. no signs active infection.  PLAN. no further intervention, no need to repeat cbc unless acute clinical need . May be due to high dose steroid.     Disposition: supportive care. Palliative care.  Brain irradiation palliative therapy. Symptoms control . [facility] transfer.

## 2018-05-25 NOTE — DISCHARGE NOTE ADULT - PLAN OF CARE
Rehab Continue radiation therapy at [facility].  3 more sessions  Physical therapy  Follow up with hemonc dr Carvajal at [facility]  Regular diet as tolerated

## 2018-05-25 NOTE — DISCHARGE NOTE ADULT - CARE PLAN
Principal Discharge DX:	Delirium due to another medical condition  Goal:	Rehab  Assessment and plan of treatment:	Continue radiation therapy at [facility].  3 more sessions  Physical therapy  Follow up with hemonc dr Carvajal at [facility]  Regular diet as tolerated  Secondary Diagnosis:	Brain metastases  Secondary Diagnosis:	Gait instability  Secondary Diagnosis:	Liver mass  Secondary Diagnosis:	Malignant neoplasm of lung, unspecified laterality, unspecified part of lung  Secondary Diagnosis:	Recurrent falls while walking  Secondary Diagnosis:	Agitation

## 2018-05-25 NOTE — DISCHARGE NOTE ADULT - SECONDARY DIAGNOSIS.
Malignant neoplasm of lung, unspecified laterality, unspecified part of lung Recurrent falls while walking Agitation Brain metastases Gait instability Liver mass

## 2018-05-29 ENCOUNTER — TRANSCRIPTION ENCOUNTER (OUTPATIENT)
Age: 68
End: 2018-05-29

## 2018-05-29 PROBLEM — C79.31 SECONDARY MALIGNANT NEOPLASM OF BRAIN: Chronic | Status: ACTIVE | Noted: 2018-05-17

## 2018-05-29 PROBLEM — R16.0 HEPATOMEGALY, NOT ELSEWHERE CLASSIFIED: Chronic | Status: ACTIVE | Noted: 2018-05-17

## 2018-05-30 VITALS
OXYGEN SATURATION: 100 % | RESPIRATION RATE: 16 BRPM | SYSTOLIC BLOOD PRESSURE: 107 MMHG | TEMPERATURE: 98 F | DIASTOLIC BLOOD PRESSURE: 63 MMHG | HEART RATE: 85 BPM

## 2018-05-31 PROCEDURE — 77427 RADIATION TX MANAGEMENT X5: CPT

## 2018-06-05 ENCOUNTER — RESULT REVIEW (OUTPATIENT)
Age: 68
End: 2018-06-05

## 2018-06-05 PROCEDURE — 88321 CONSLTJ&REPRT SLD PREP ELSWR: CPT

## 2018-06-06 ENCOUNTER — OTHER (OUTPATIENT)
Age: 68
End: 2018-06-06

## 2018-06-15 ENCOUNTER — OUTPATIENT (OUTPATIENT)
Dept: OUTPATIENT SERVICES | Facility: HOSPITAL | Age: 68
LOS: 1 days | Discharge: HOME | End: 2018-06-15

## 2018-06-15 DIAGNOSIS — C34.90 MALIGNANT NEOPLASM OF UNSPECIFIED PART OF UNSPECIFIED BRONCHUS OR LUNG: ICD-10-CM

## 2018-06-15 DIAGNOSIS — Z98.890 OTHER SPECIFIED POSTPROCEDURAL STATES: Chronic | ICD-10-CM

## 2018-06-17 ENCOUNTER — FORM ENCOUNTER (OUTPATIENT)
Age: 68
End: 2018-06-17

## 2018-06-18 ENCOUNTER — APPOINTMENT (OUTPATIENT)
Dept: NUCLEAR MEDICINE | Facility: CLINIC | Age: 68
End: 2018-06-18
Payer: MEDICARE

## 2018-06-18 ENCOUNTER — FORM ENCOUNTER (OUTPATIENT)
Age: 68
End: 2018-06-18

## 2018-06-18 ENCOUNTER — OUTPATIENT (OUTPATIENT)
Dept: OUTPATIENT SERVICES | Facility: HOSPITAL | Age: 68
LOS: 1 days | End: 2018-06-18

## 2018-06-18 DIAGNOSIS — Z98.890 OTHER SPECIFIED POSTPROCEDURAL STATES: Chronic | ICD-10-CM

## 2018-06-18 DIAGNOSIS — C79.9 SECONDARY MALIGNANT NEOPLASM OF UNSPECIFIED SITE: ICD-10-CM

## 2018-06-18 PROCEDURE — 78815 PET IMAGE W/CT SKULL-THIGH: CPT | Mod: 26,PI

## 2018-06-19 ENCOUNTER — RESULT REVIEW (OUTPATIENT)
Age: 68
End: 2018-06-19

## 2018-06-19 ENCOUNTER — LABORATORY RESULT (OUTPATIENT)
Age: 68
End: 2018-06-19

## 2018-06-19 ENCOUNTER — APPOINTMENT (OUTPATIENT)
Dept: HEMATOLOGY ONCOLOGY | Facility: CLINIC | Age: 68
End: 2018-06-19
Payer: MEDICARE

## 2018-06-19 ENCOUNTER — OUTPATIENT (OUTPATIENT)
Dept: OUTPATIENT SERVICES | Facility: HOSPITAL | Age: 68
LOS: 1 days | End: 2018-06-19

## 2018-06-19 ENCOUNTER — APPOINTMENT (OUTPATIENT)
Dept: ULTRASOUND IMAGING | Facility: CLINIC | Age: 68
End: 2018-06-19
Payer: MEDICARE

## 2018-06-19 VITALS
BODY MASS INDEX: 23.59 KG/M2 | OXYGEN SATURATION: 97 % | WEIGHT: 129 LBS | HEART RATE: 79 BPM | DIASTOLIC BLOOD PRESSURE: 62 MMHG | SYSTOLIC BLOOD PRESSURE: 94 MMHG

## 2018-06-19 DIAGNOSIS — C34.92 MALIGNANT NEOPLASM OF UNSPECIFIED PART OF LEFT BRONCHUS OR LUNG: ICD-10-CM

## 2018-06-19 DIAGNOSIS — Z98.890 OTHER SPECIFIED POSTPROCEDURAL STATES: Chronic | ICD-10-CM

## 2018-06-19 DIAGNOSIS — M79.89 OTHER SPECIFIED SOFT TISSUE DISORDERS: ICD-10-CM

## 2018-06-19 LAB
BASOPHILS # BLD AUTO: 0 K/UL — SIGNIFICANT CHANGE UP (ref 0–0.2)
BASOPHILS NFR BLD AUTO: 0.5 % — SIGNIFICANT CHANGE UP (ref 0–2)
EOSINOPHIL # BLD AUTO: 0 K/UL — SIGNIFICANT CHANGE UP (ref 0–0.5)
EOSINOPHIL NFR BLD AUTO: 0.2 % — SIGNIFICANT CHANGE UP (ref 0–6)
HCT VFR BLD CALC: 39.5 % — SIGNIFICANT CHANGE UP (ref 34.5–45)
HGB BLD-MCNC: 13.7 G/DL — SIGNIFICANT CHANGE UP (ref 11.5–15.5)
LYMPHOCYTES # BLD AUTO: 1.3 K/UL — SIGNIFICANT CHANGE UP (ref 1–3.3)
LYMPHOCYTES # BLD AUTO: 13.9 % — SIGNIFICANT CHANGE UP (ref 13–44)
MCHC RBC-ENTMCNC: 33.3 PG — SIGNIFICANT CHANGE UP (ref 27–34)
MCHC RBC-ENTMCNC: 34.7 GM/DL — SIGNIFICANT CHANGE UP (ref 32–36)
MCV RBC AUTO: 96.1 FL — SIGNIFICANT CHANGE UP (ref 80–100)
MONOCYTES # BLD AUTO: 0.7 K/UL — SIGNIFICANT CHANGE UP (ref 0–0.9)
MONOCYTES NFR BLD AUTO: 8.1 % — SIGNIFICANT CHANGE UP (ref 2–14)
NEUTROPHILS # BLD AUTO: 7 K/UL — SIGNIFICANT CHANGE UP (ref 1.8–7.4)
NEUTROPHILS NFR BLD AUTO: 77.4 % — HIGH (ref 43–77)
PLATELET # BLD AUTO: 177 K/UL — SIGNIFICANT CHANGE UP (ref 150–400)
RBC # BLD: 4.11 M/UL — SIGNIFICANT CHANGE UP (ref 3.8–5.2)
RBC # FLD: 14.5 % — SIGNIFICANT CHANGE UP (ref 10.3–14.5)
WBC # BLD: 9 K/UL — SIGNIFICANT CHANGE UP (ref 3.8–10.5)
WBC # FLD AUTO: 9 K/UL — SIGNIFICANT CHANGE UP (ref 3.8–10.5)

## 2018-06-19 PROCEDURE — 99214 OFFICE O/P EST MOD 30 MIN: CPT

## 2018-06-19 PROCEDURE — 93970 EXTREMITY STUDY: CPT | Mod: 26

## 2018-06-19 RX ORDER — NICOTINE 14 MG/24H
14 PATCH, EXTENDED RELEASE TRANSDERMAL
Qty: 30 | Refills: 6 | Status: DISCONTINUED | COMMUNITY
End: 2018-06-19

## 2018-06-19 RX ORDER — DOCUSATE SODIUM 100 MG/1
100 CAPSULE, LIQUID FILLED ORAL
Refills: 0 | Status: DISCONTINUED | COMMUNITY
End: 2018-06-19

## 2018-06-19 RX ORDER — LISINOPRIL AND HYDROCHLOROTHIAZIDE TABLETS 10; 12.5 MG/1; MG/1
10-12.5 TABLET ORAL
Qty: 30 | Refills: 0 | Status: DISCONTINUED | COMMUNITY
Start: 2018-05-01 | End: 2018-06-19

## 2018-06-19 RX ORDER — ACETAMINOPHEN 325 MG/1
325 TABLET ORAL
Refills: 0 | Status: DISCONTINUED | COMMUNITY
End: 2018-06-19

## 2018-06-19 RX ORDER — FAMOTIDINE 20 MG/1
20 TABLET, FILM COATED ORAL
Refills: 0 | Status: DISCONTINUED | COMMUNITY
End: 2018-06-19

## 2018-06-19 RX ORDER — ALPRAZOLAM 0.25 MG/1
0.25 TABLET ORAL 4 TIMES DAILY
Qty: 30 | Refills: 0 | Status: DISCONTINUED | COMMUNITY
Start: 2018-05-09 | End: 2018-06-19

## 2018-06-20 LAB
APTT BLD: 24 SEC
INR PPP: 0.88 RATIO
LDH SERPL-CCNC: 1322 U/L
MAGNESIUM SERPL-MCNC: 2 MG/DL
PT BLD: 9.9 SEC

## 2018-06-26 ENCOUNTER — RX RENEWAL (OUTPATIENT)
Age: 68
End: 2018-06-26

## 2018-06-26 RX ORDER — PROCHLORPERAZINE MALEATE 10 MG/1
10 TABLET ORAL
Qty: 30 | Refills: 1 | Status: ACTIVE | COMMUNITY
Start: 2018-06-26 | End: 1900-01-01

## 2018-06-26 RX ORDER — ONDANSETRON 8 MG/1
8 TABLET ORAL
Qty: 20 | Refills: 1 | Status: ACTIVE | COMMUNITY
Start: 2018-06-26 | End: 1900-01-01

## 2018-06-28 ENCOUNTER — NON-APPOINTMENT (OUTPATIENT)
Age: 68
End: 2018-06-28

## 2018-06-28 ENCOUNTER — APPOINTMENT (OUTPATIENT)
Dept: FAMILY MEDICINE | Facility: CLINIC | Age: 68
End: 2018-06-28
Payer: MEDICARE

## 2018-06-28 VITALS
BODY MASS INDEX: 25.58 KG/M2 | SYSTOLIC BLOOD PRESSURE: 100 MMHG | HEIGHT: 62 IN | HEART RATE: 80 BPM | DIASTOLIC BLOOD PRESSURE: 62 MMHG | WEIGHT: 139 LBS

## 2018-06-28 DIAGNOSIS — R55 SYNCOPE AND COLLAPSE: ICD-10-CM

## 2018-06-28 DIAGNOSIS — M79.89 OTHER SPECIFIED SOFT TISSUE DISORDERS: ICD-10-CM

## 2018-06-28 DIAGNOSIS — C79.9 SECONDARY MALIGNANT NEOPLASM OF UNSPECIFIED SITE: ICD-10-CM

## 2018-06-28 DIAGNOSIS — I82.409 ACUTE EMBOLISM AND THROMBOSIS OF UNSPECIFIED DEEP VEINS OF UNSPECIFIED LOWER EXTREMITY: ICD-10-CM

## 2018-06-28 DIAGNOSIS — Z01.818 ENCOUNTER FOR OTHER PREPROCEDURAL EXAMINATION: ICD-10-CM

## 2018-06-28 PROCEDURE — 93000 ELECTROCARDIOGRAM COMPLETE: CPT

## 2018-06-28 PROCEDURE — 99215 OFFICE O/P EST HI 40 MIN: CPT | Mod: 25

## 2018-06-28 PROCEDURE — 99204 OFFICE O/P NEW MOD 45 MIN: CPT | Mod: 25

## 2018-06-28 PROCEDURE — 99214 OFFICE O/P EST MOD 30 MIN: CPT | Mod: 25

## 2018-06-28 NOTE — HISTORY OF PRESENT ILLNESS
[Date: ____________] : Patient's last distress assessment performed on [unfilled]. [10 - Extreme Distress] : Distress Level: 10 [Patient given social work contact information and resource sheet] : Patient was given social work contact information and resource sheet [FreeTextEntry2] : 06/28/2018 [FreeTextEntry1] : Here to establish care/need Preop clearance [de-identified] : Ms. Downing was diagnosed with small cell carcinoma in May 2018.\par \par She initially developed right sided weakness in April 2018.  She was referred to neurology.  \par Brain MRI on 5/2/18 showed multiple enhancing mass lesions some of which are hemorrhagic involving the supra and infratentorial brain concerning for metastatic disease with extensive surrounding vasogenic edema involving many lesions. There is extensive vasogenic edema involving right cerebellar hemisphere metastatic lesions with mass effect and partial effacement of fourth ventricle.  \par \par She was sent to [facility] for admission after brain MRI.  \par CT chest on 5/2/18 showed  3 x 3.4 cm spiculated lesion in YAHAIRA portions of which extend to pleural space further distally consistent with neoplasm. There is lymphadenopathy in the prevascular space with loss of fat plan along pulmonary artery with a conglomerate measurement of 4.7 x 3.4 cm and mild left hilar adenopathy.   \par CT abd/pelvis on 5/2/18 - peripheral right hepatic lobe lesion 2.4 x 1.7 cm, and additional hepatic lesion identified centrally, adjacent to dale heptis measuring 3 x 2.4 cm.  \par \par Final Diagnosis\par Consult case\par Liver lesion, core biopsy:  [id number]\par - Morphologically, with features of metastatic small cell\par carcinoma, primary site\par undetermined, see comment\par \par Comment:\par Tumor cells are positive for pancytokeratin and CDX2 with focal\par positivity for CK7, rare positivity with CD56; while negative for\par chromogranin, synaptophysin, p40, TTF-1, CK20, PAX8, ER and\par GATA3. KI67, 90%.\par \par Case was reviewed by Dr. Lewis Barlow, [facility] with specimen\par number #[id number].\par "The findings are consistent with metastatic small cell\par carcinoma. Clinical correlation and imaging studies are needed to\par facilitate a definitive diagnosis, excluding gastrointestinal\par origin and to consider lung biopsy if clinically\par indicated". [de-identified] : Returns for follow up.  She was hospitalized in May 2018 for worsening mental status changes and her family's inability to care for her at home.  CT head showed multiple brain mets. She completed WBRT first week of June. She was discharged from rehab on 6/14/18\par Her mobility is still limited, but is right hand strength/function  is improved. She is able to ambulate with assistance. \par No headaches. No dizziness. No nausea/vomiting.\par She's on dexamethasone 4 mg bid. \par Denies any SOB. No pain. Mood is significantly better compared to before. \par \par 06/28/2018- Chart reviewed  wheel chairr bound,,patient is here with son and daughter,daughter reports patient fainted this morning at breakfast table,she slowly went down the table,no tonic,clonic activity,no postictal did not report chest pain,SOB and palpitations,no nausea,no vomitting. As per daughter it took about 1 minute and she was okay,suffers from hypotension and felt fine after wards. [FreeTextEntry7] : Reports financial and transportation concerns. Depression, body image/self esteem stressors and physical stressors such as bathing/dressing and fatigue.

## 2018-06-28 NOTE — CONSULT LETTER
[Dear  ___] : Dear  [unfilled], [Consult Letter:] : I had the pleasure of evaluating your patient, [unfilled]. [Please see my note below.] : Please see my note below. [Consult Closing:] : Thank you very much for allowing me to participate in the care of this patient.  If you have any questions, please do not hesitate to contact me. [Sincerely,] : Sincerely, [FreeTextEntry3] : Precious Sanders MD\par Medical Oncology/Hematology\par [facility]\par [facility]

## 2018-06-28 NOTE — REVIEW OF SYSTEMS
[Fainting] : fainting [Difficulty Walking] : difficulty walking [Negative] : Heme/Lymph [Confused] : no confusion [Dizziness] : no dizziness [FreeTextEntry2] : see HPI [de-identified] : see HPI /wheelchair bound

## 2018-06-28 NOTE — ASSESSMENT
[Patient Optimized for Surgery] : Patient optimized for surgery [No Further Testing Recommended] : no further testing recommended [Modify medications prior to procedure] : Modify medications prior to procedure [As per surgery] : as per surgery [FreeTextEntry4] : S [FreeTextEntry7] : last dose of Lovenox tonight and to be resumed afetr procedure /keppra with sip of water in the morning [FreeTextEntry1] : Small cell Ca under chemo- needs port placement/EKG reviewed from [facility] done 05/02 and Diego 06/26 ,no acute changes. will get lab tomorrow for CMP as per presurgical clearance Latrice ALAN.\par fainting- could be vasovagal- will watch\par DVT- on lovenox\par \par follow up as needed\par \par \par 68 year old with extensive stage small cell lung cancer.  She has a YAHAIRA mass with mets to lymph nodes, brain, bone and liver.  S/p WBRT completed 6/2018.  She's s/p liver biopsy at [facility].  [place] [facility] [place] cell carcinoma. \par \par Now home from rehab, functionally doing better, RUE strength is improved.  We discussed natural history of extensive stage small cell lung cancer at length.  Reviewed standard of care treatment options with Carbo AUC 5 and Etoposide 100 mg/m2 days 1-3 every 3 weeks for 4-6 cycles with plans for restaging after 2 cycles of treatment. Side effects were reviewed. She signed informed consent. \par \par Plan:\par Decrease dexamethasone 4 mg to once a day\par Carboplatin + etoposide with onpro for 4-6 cycles, restaging after 2 cycles.\par leg swelling - US doppler today\par Continue keppra and risperidone\par PORT placement\par Left thyroid nodules - will need US and FNA

## 2018-06-28 NOTE — REVIEW OF SYSTEMS
[Fainting] : fainting [Difficulty Walking] : difficulty walking [Negative] : Heme/Lymph [Confused] : no confusion [Dizziness] : no dizziness [FreeTextEntry2] : see HPI [de-identified] : see HPI /wheelchair bound

## 2018-06-28 NOTE — ASSESSMENT
[Patient Optimized for Surgery] : Patient optimized for surgery [No Further Testing Recommended] : no further testing recommended [Modify medications prior to procedure] : Modify medications prior to procedure [As per surgery] : as per surgery [FreeTextEntry4] : S [FreeTextEntry7] : last dose of Lovenox tonight and to be resumed afetr procedure /keppra with sip of water in the morning [FreeTextEntry1] : Small cell Ca under chemo- needs port placement/EKG reviewed from [facility] done 05/02 and [place] 06/26 ,no acute changes. will get lab tomorrow for CMP as per presurgical clearance Latrice ALAN.\par fainting- could be vasovagal- will watch\par DVT- on lovenox\par \par follow up as needed\par \par \par 68 year old with extensive stage small cell lung cancer.  She has a YAHAIRA mass with mets to lymph nodes, brain, bone and liver.  S/p WBRT completed 6/2018.  She's s/p liver biopsy at [facility].  Pathology reviewed at [facility] [place] - findings consistent with metastatic small cell carcinoma. \par \par Now home from rehab, functionally doing better, RUE strength is improved.  We discussed natural history of extensive stage small cell lung cancer at length.  Reviewed standard of care treatment options with Carbo AUC 5 and Etoposide 100 mg/m2 days 1-3 every 3 weeks for 4-6 cycles with plans for restaging after 2 cycles of treatment. Side effects were reviewed. She signed informed consent. \par \par Plan:\par Decrease dexamethasone 4 mg to once a day\par Carboplatin + etoposide with onpro for 4-6 cycles, restaging after 2 cycles.\par leg swelling - US doppler today\par Continue keppra and risperidone\par PORT placement\par Left thyroid nodules - will need US and FNA

## 2018-06-28 NOTE — RESULTS/DATA
[] : results reviewed [de-identified] : will be done tmw as per Latrice ALAN [FreeTextEntry1] : 6/18/18 PET CT - 1.  FDG avid left upper lobe mass with bulky mediastinal and left hilar lymphadenopathy consistent with primary lung cancer with curry metastatic \par disease.\par 2.  Multiple FDG avid hepatic lesions, dominant lesion increased since CT of May 2, 2018.\par 3.  FDG avid bilateral adrenal lesions, probably metastatic disease. Left adrenal lesion appears new since prior study.\par 4.  Soft tissue nodule in the left lower quadrant peritoneum, probably peritoneal metastasis.\par 5.  FDG avid left thyroid lobe nodules. Suggest further evaluation with ultrasound and FNA.\par 6.  FDG avid osseous lesions suspicious for metastatic disease.\par

## 2018-06-28 NOTE — HISTORY OF PRESENT ILLNESS
[Date: ____________] : Patient's last distress assessment performed on [unfilled]. [10 - Extreme Distress] : Distress Level: 10 [Patient given social work contact information and resource sheet] : Patient was given social work contact information and resource sheet [FreeTextEntry2] : 06/28/2018 [FreeTextEntry1] : Here to establish care/need Preop clearance [de-identified] : Ms. Downing was diagnosed with small cell carcinoma in May 2018.\par \par She initially developed right sided weakness in April 2018.  She was referred to neurology.  \par Brain MRI on 5/2/18 showed multiple enhancing mass lesions some of which are hemorrhagic involving the supra and infratentorial brain concerning for metastatic disease with extensive surrounding vasogenic edema involving many lesions. There is extensive vasogenic edema involving right cerebellar hemisphere metastatic lesions with mass effect and partial effacement of fourth ventricle.  \par \par She was sent to [facility] for admission after brain MRI.  \par CT chest on 5/2/18 showed  3 x 3.4 cm spiculated lesion in YAHAIRA portions of which extend to pleural space further distally consistent with neoplasm. There is lymphadenopathy in the prevascular space with loss of fat plan along pulmonary artery with a conglomerate measurement of 4.7 x 3.4 cm and mild left hilar adenopathy.   \par CT abd/pelvis on 5/2/18 - peripheral right hepatic lobe lesion 2.4 x 1.7 cm, and additional hepatic lesion identified centrally, adjacent to dale heptis measuring 3 x 2.4 cm.  \par \par Final Diagnosis\par Consult case\par Liver lesion, core biopsy:  [id number]\par - Morphologically, with features of metastatic small cell\par carcinoma, primary site\par undetermined, see comment\par \par Comment:\par Tumor cells are positive for pancytokeratin and CDX2 with focal\par positivity for CK7, rare positivity with CD56; while negative for\par chromogranin, synaptophysin, p40, TTF-1, CK20, PAX8, ER and\par GATA3. KI67, 90%.\par \par Case was reviewed by Dr. Lewis Barlow, [facility] with specimen\par number #[id number].\par "The findings are consistent with metastatic small cell\par carcinoma. Clinical correlation and imaging studies are needed to\par facilitate a definitive diagnosis, excluding gastrointestinal\par origin and to consider lung biopsy if clinically\par indicated". [de-identified] : Returns for follow up.  She was hospitalized in May 2018 for worsening mental status changes and her family's inability to care for her at home.  CT head showed multiple brain mets. She completed WBRT first week of June. She was discharged from rehab on 6/14/18\par Her mobility is still limited, but is right hand strength/function  is improved. She is able to ambulate with assistance. \par No headaches. No dizziness. No nausea/vomiting.\par She's on dexamethasone 4 mg bid. \par Denies any SOB. No pain. Mood is significantly better compared to before. \par \par 06/28/2018- Chart reviewed  wheel chairr bound,,patient is here with son and daughter,daughter reports patient fainted this morning at breakfast table,she slowly went down the table,no tonic,clonic activity,no postictal did not report chest pain,SOB and palpitations,no nausea,no vomitting. As per daughter it took about 1 minute and she was okay,suffers from hypotension and felt fine after wards. [FreeTextEntry7] : Reports financial and transportation concerns. Depression, body image/self esteem stressors and physical stressors such as bathing/dressing and fatigue.

## 2018-06-28 NOTE — PHYSICAL EXAM
[Normal] : RRR, normal S1S2, no murmurs, rubs, gallops [No Acute Distress] : no acute distress [Well Nourished] : well nourished [Well Developed] : well developed [Well-Appearing] : well-appearing [Normal Sclera/Conjunctiva] : normal sclera/conjunctiva [PERRL] : pupils equal round and reactive to light [EOMI] : extraocular movements intact [Normal Outer Ear/Nose] : the outer ears and nose were normal in appearance [Normal Oropharynx] : the oropharynx was normal [No JVD] : no jugular venous distention [Supple] : supple [No Lymphadenopathy] : no lymphadenopathy [Thyroid Normal, No Nodules] : the thyroid was normal and there were no nodules present [No Respiratory Distress] : no respiratory distress  [Clear to Auscultation] : lungs were clear to auscultation bilaterally [No Accessory Muscle Use] : no accessory muscle use [Normal Rate] : normal rate  [Regular Rhythm] : with a regular rhythm [Normal S1, S2] : normal S1 and S2 [No Murmur] : no murmur heard [No Carotid Bruits] : no carotid bruits [No Abdominal Bruit] : a ~M bruit was not heard ~T in the abdomen [No Varicosities] : no varicosities [Pedal Pulses Present] : the pedal pulses are present [No Edema] : there was no peripheral edema [No Extremity Clubbing/Cyanosis] : no extremity clubbing/cyanosis [No Palpable Aorta] : no palpable aorta [Soft] : abdomen soft [Non Tender] : non-tender [Non-distended] : non-distended [No Masses] : no abdominal mass palpated [No HSM] : no HSM [Normal Bowel Sounds] : normal bowel sounds [Normal Posterior Cervical Nodes] : no posterior cervical lymphadenopathy [Normal Anterior Cervical Nodes] : no anterior cervical lymphadenopathy [No CVA Tenderness] : no CVA  tenderness [No Spinal Tenderness] : no spinal tenderness [No Joint Swelling] : no joint swelling [Grossly Normal Strength/Tone] : grossly normal strength/tone [No Rash] : no rash [Normal Affect] : the affect was normal [Normal Insight/Judgement] : insight and judgment were intact [de-identified] : wheel chair bound [de-identified] : seated in a wheelchair [de-identified] : supple [de-identified] : clear bilaterally [de-identified] : S1/S2 regular [de-identified] : edema 2+ lower extremities [de-identified] : soft, non tender, non disteded [de-identified] : no cervical or supraclavicular adenopathy [de-identified] : normal mood

## 2018-06-28 NOTE — CONSULT LETTER
[Dear  ___] : Dear  [unfilled], [Consult Letter:] : I had the pleasure of evaluating your patient, [unfilled]. [Please see my note below.] : Please see my note below. [Consult Closing:] : Thank you very much for allowing me to participate in the care of this patient.  If you have any questions, please do not hesitate to contact me. [Sincerely,] : Sincerely, [FreeTextEntry3] : Precious Sanders MD\par Medical Oncology/Hematology\par [facility] [place]\par [facility] [place]

## 2018-06-28 NOTE — RESULTS/DATA
[] : results reviewed [de-identified] : will be done tmw as per Latrice ALAN [FreeTextEntry1] : 6/18/18 PET CT - 1.  FDG avid left upper lobe mass with bulky mediastinal and left hilar lymphadenopathy consistent with primary lung cancer with curry metastatic \par disease.\par 2.  Multiple FDG avid hepatic lesions, dominant lesion increased since CT of May 2, 2018.\par 3.  FDG avid bilateral adrenal lesions, probably metastatic disease. Left adrenal lesion appears new since prior study.\par 4.  Soft tissue nodule in the left lower quadrant peritoneum, probably peritoneal metastasis.\par 5.  FDG avid left thyroid lobe nodules. Suggest further evaluation with ultrasound and FNA.\par 6.  FDG avid osseous lesions suspicious for metastatic disease.\par

## 2018-06-28 NOTE — PHYSICAL EXAM
[Normal] : RRR, normal S1S2, no murmurs, rubs, gallops [No Acute Distress] : no acute distress [Well Nourished] : well nourished [Well Developed] : well developed [Well-Appearing] : well-appearing [Normal Sclera/Conjunctiva] : normal sclera/conjunctiva [PERRL] : pupils equal round and reactive to light [EOMI] : extraocular movements intact [Normal Outer Ear/Nose] : the outer ears and nose were normal in appearance [Normal Oropharynx] : the oropharynx was normal [No JVD] : no jugular venous distention [Supple] : supple [No Lymphadenopathy] : no lymphadenopathy [Thyroid Normal, No Nodules] : the thyroid was normal and there were no nodules present [No Respiratory Distress] : no respiratory distress  [Clear to Auscultation] : lungs were clear to auscultation bilaterally [No Accessory Muscle Use] : no accessory muscle use [Normal Rate] : normal rate  [Regular Rhythm] : with a regular rhythm [Normal S1, S2] : normal S1 and S2 [No Murmur] : no murmur heard [No Carotid Bruits] : no carotid bruits [No Abdominal Bruit] : a ~M bruit was not heard ~T in the abdomen [No Varicosities] : no varicosities [Pedal Pulses Present] : the pedal pulses are present [No Edema] : there was no peripheral edema [No Extremity Clubbing/Cyanosis] : no extremity clubbing/cyanosis [No Palpable Aorta] : no palpable aorta [Soft] : abdomen soft [Non Tender] : non-tender [Non-distended] : non-distended [No Masses] : no abdominal mass palpated [No HSM] : no HSM [Normal Bowel Sounds] : normal bowel sounds [Normal Posterior Cervical Nodes] : no posterior cervical lymphadenopathy [Normal Anterior Cervical Nodes] : no anterior cervical lymphadenopathy [No CVA Tenderness] : no CVA  tenderness [No Spinal Tenderness] : no spinal tenderness [No Joint Swelling] : no joint swelling [Grossly Normal Strength/Tone] : grossly normal strength/tone [No Rash] : no rash [Normal Affect] : the affect was normal [Normal Insight/Judgement] : insight and judgment were intact [de-identified] : wheel chair bound [de-identified] : seated in a wheelchair [de-identified] : supple [de-identified] : clear bilaterally [de-identified] : S1/S2 regular [de-identified] : edema 2+ lower extremities [de-identified] : soft, non tender, non disteded [de-identified] : no cervical or supraclavicular adenopathy [de-identified] : normal mood

## 2018-06-29 ENCOUNTER — EMERGENCY (EMERGENCY)
Facility: HOSPITAL | Age: 68
LOS: 1 days | Discharge: HOME | End: 2018-06-29
Attending: EMERGENCY MEDICINE
Payer: MEDICARE

## 2018-06-29 VITALS
RESPIRATION RATE: 14 BRPM | DIASTOLIC BLOOD PRESSURE: 72 MMHG | TEMPERATURE: 99 F | OXYGEN SATURATION: 95 % | SYSTOLIC BLOOD PRESSURE: 116 MMHG | HEART RATE: 81 BPM

## 2018-06-29 VITALS
TEMPERATURE: 99 F | HEIGHT: 62 IN | WEIGHT: 134.04 LBS | DIASTOLIC BLOOD PRESSURE: 65 MMHG | RESPIRATION RATE: 14 BRPM | OXYGEN SATURATION: 97 % | SYSTOLIC BLOOD PRESSURE: 102 MMHG | HEART RATE: 77 BPM

## 2018-06-29 DIAGNOSIS — Z98.890 OTHER SPECIFIED POSTPROCEDURAL STATES: Chronic | ICD-10-CM

## 2018-06-29 LAB
ALBUMIN SERPL ELPH-MCNC: 3 G/DL — LOW (ref 3.3–5.2)
ALP SERPL-CCNC: 200 U/L — HIGH (ref 40–120)
ALT FLD-CCNC: 359 U/L — HIGH
ANION GAP SERPL CALC-SCNC: 13 MMOL/L — SIGNIFICANT CHANGE UP (ref 5–17)
APTT BLD: 31.4 SEC — SIGNIFICANT CHANGE UP (ref 27.5–37.4)
AST SERPL-CCNC: 133 U/L — HIGH
BASOPHILS # BLD AUTO: 0 K/UL — SIGNIFICANT CHANGE UP (ref 0–0.2)
BASOPHILS NFR BLD AUTO: 0.6 % — SIGNIFICANT CHANGE UP (ref 0–2)
BILIRUB SERPL-MCNC: 1 MG/DL — SIGNIFICANT CHANGE UP (ref 0.4–2)
BUN SERPL-MCNC: 11 MG/DL — SIGNIFICANT CHANGE UP (ref 8–20)
CALCIUM SERPL-MCNC: 8.8 MG/DL — SIGNIFICANT CHANGE UP (ref 8.6–10.2)
CHLORIDE SERPL-SCNC: 101 MMOL/L — SIGNIFICANT CHANGE UP (ref 98–107)
CO2 SERPL-SCNC: 23 MMOL/L — SIGNIFICANT CHANGE UP (ref 22–29)
CREAT SERPL-MCNC: 0.52 MG/DL — SIGNIFICANT CHANGE UP (ref 0.5–1.3)
EOSINOPHIL # BLD AUTO: 0 K/UL — SIGNIFICANT CHANGE UP (ref 0–0.5)
EOSINOPHIL NFR BLD AUTO: 0.5 % — SIGNIFICANT CHANGE UP (ref 0–6)
GLUCOSE SERPL-MCNC: 81 MG/DL — SIGNIFICANT CHANGE UP (ref 70–115)
HCT VFR BLD CALC: 39.6 % — SIGNIFICANT CHANGE UP (ref 37–47)
HGB BLD-MCNC: 13 G/DL — SIGNIFICANT CHANGE UP (ref 12–16)
INR BLD: 0.99 RATIO — SIGNIFICANT CHANGE UP (ref 0.88–1.16)
LYMPHOCYTES # BLD AUTO: 1.2 K/UL — SIGNIFICANT CHANGE UP (ref 1–4.8)
LYMPHOCYTES # BLD AUTO: 14.6 % — LOW (ref 20–55)
MCHC RBC-ENTMCNC: 32.4 PG — HIGH (ref 27–31)
MCHC RBC-ENTMCNC: 32.8 G/DL — SIGNIFICANT CHANGE UP (ref 32–36)
MCV RBC AUTO: 98.8 FL — SIGNIFICANT CHANGE UP (ref 81–99)
MONOCYTES # BLD AUTO: 0.9 K/UL — HIGH (ref 0–0.8)
MONOCYTES NFR BLD AUTO: 11.2 % — HIGH (ref 3–10)
NEUTROPHILS # BLD AUTO: 5.6 K/UL — SIGNIFICANT CHANGE UP (ref 1.8–8)
NEUTROPHILS NFR BLD AUTO: 70.7 % — SIGNIFICANT CHANGE UP (ref 37–73)
PLATELET # BLD AUTO: 209 K/UL — SIGNIFICANT CHANGE UP (ref 150–400)
POTASSIUM SERPL-MCNC: 4 MMOL/L — SIGNIFICANT CHANGE UP (ref 3.5–5.3)
POTASSIUM SERPL-SCNC: 4 MMOL/L — SIGNIFICANT CHANGE UP (ref 3.5–5.3)
PROT SERPL-MCNC: 5.8 G/DL — LOW (ref 6.6–8.7)
PROTHROM AB SERPL-ACNC: 10.9 SEC — SIGNIFICANT CHANGE UP (ref 9.8–12.7)
RBC # BLD: 4.01 M/UL — LOW (ref 4.4–5.2)
RBC # FLD: 17.7 % — HIGH (ref 11–15.6)
SODIUM SERPL-SCNC: 137 MMOL/L — SIGNIFICANT CHANGE UP (ref 135–145)
TROPONIN T SERPL-MCNC: <0.01 NG/ML — SIGNIFICANT CHANGE UP (ref 0–0.06)
TROPONIN T SERPL-MCNC: <0.01 NG/ML — SIGNIFICANT CHANGE UP (ref 0–0.06)
WBC # BLD: 8 K/UL — SIGNIFICANT CHANGE UP (ref 4.8–10.8)
WBC # FLD AUTO: 8 K/UL — SIGNIFICANT CHANGE UP (ref 4.8–10.8)

## 2018-06-29 PROCEDURE — 99284 EMERGENCY DEPT VISIT MOD MDM: CPT | Mod: 25

## 2018-06-29 PROCEDURE — 70450 CT HEAD/BRAIN W/O DYE: CPT

## 2018-06-29 PROCEDURE — 93005 ELECTROCARDIOGRAM TRACING: CPT

## 2018-06-29 PROCEDURE — 85027 COMPLETE CBC AUTOMATED: CPT

## 2018-06-29 PROCEDURE — 84484 ASSAY OF TROPONIN QUANT: CPT

## 2018-06-29 PROCEDURE — 85730 THROMBOPLASTIN TIME PARTIAL: CPT

## 2018-06-29 PROCEDURE — 93010 ELECTROCARDIOGRAM REPORT: CPT

## 2018-06-29 PROCEDURE — 36415 COLL VENOUS BLD VENIPUNCTURE: CPT

## 2018-06-29 PROCEDURE — 82962 GLUCOSE BLOOD TEST: CPT

## 2018-06-29 PROCEDURE — 70450 CT HEAD/BRAIN W/O DYE: CPT | Mod: 26

## 2018-06-29 PROCEDURE — 85610 PROTHROMBIN TIME: CPT

## 2018-06-29 PROCEDURE — 80053 COMPREHEN METABOLIC PANEL: CPT

## 2018-06-29 PROCEDURE — 99284 EMERGENCY DEPT VISIT MOD MDM: CPT

## 2018-06-29 RX ORDER — SODIUM CHLORIDE 9 MG/ML
1000 INJECTION INTRAMUSCULAR; INTRAVENOUS; SUBCUTANEOUS ONCE
Qty: 0 | Refills: 0 | Status: COMPLETED | OUTPATIENT
Start: 2018-06-29 | End: 2018-06-29

## 2018-06-29 RX ADMIN — SODIUM CHLORIDE 1000 MILLILITER(S): 9 INJECTION INTRAMUSCULAR; INTRAVENOUS; SUBCUTANEOUS at 15:15

## 2018-06-29 NOTE — ED PROVIDER NOTE - MEDICAL DECISION MAKING DETAILS
Patient status post syncope, likely related to dehydration. Do labs, give IV fluids, and contact interventional radiology.

## 2018-06-29 NOTE — ED ADULT NURSE NOTE - OBJECTIVE STATEMENT
PT axox3 s/p syncopal episode at home. As per family patient has not been eatign and drinking and feel this is dehydration. Pt had witnessed fall, denies use of blood thinners.  Pt has no complaints of pain, does c/o weakness/fatigue.  No obvious signs of trauma noted

## 2018-06-29 NOTE — ED ADULT TRIAGE NOTE - CHIEF COMPLAINT QUOTE
Pt has been NPO for port placement scheduled here today at noon. Was leaving her home to come here and had syncopal episode. Pt A&O x's 3 at this time

## 2018-06-29 NOTE — ED PROVIDER NOTE - CONSTITUTIONAL, MLM
normal... Appearing sickly (likely from radiation), well nourished, awake, alert, oriented to person, place, time/situation and in no apparent distress.

## 2018-06-29 NOTE — ED PROVIDER NOTE - NS_ ATTENDINGSCRIBEDETAILS _ED_A_ED_FT
I, Travis Fairchild, performed the initial face to face bedside interview with this patient regarding history of present illness, review of symptoms and relevant past medical, social and family history.  I completed an independent physical examination.    The history, relevant review of systems, past medical and surgical history, medical decision making, and physical examination was documented by the scribe in my presence and I attest to the accuracy of the documentation. I, Jimenez Fairchild, performed the initial face to face bedside interview with this patient regarding history of present illness, review of symptoms and relevant past medical, social and family history.  I completed an independent physical examination.    The history, relevant review of systems, past medical and surgical history, medical decision making, and physical examination was documented by the scribe in my presence and I attest to the accuracy of the documentation.

## 2018-06-29 NOTE — ED PROVIDER NOTE - PROGRESS NOTE DETAILS
2nd Troponin neg.  Pt refusing admission and signed out AMA.  Pt aware of risks and consequences and understands to return immediately for any problems or for re-evalaution

## 2018-06-29 NOTE — ED PROVIDER NOTE - CARE PLAN
Principal Discharge DX:	Syncope  Secondary Diagnosis:	Malignant neoplasm of lung, unspecified laterality, unspecified part of lung  Secondary Diagnosis:	Brain metastases

## 2018-06-29 NOTE — ED PROVIDER NOTE - OBJECTIVE STATEMENT
This is a 67 y/o female with a PMHx of lung cancer (to start chemotherapy soon) and baseline low BP, presenting to the ED s/p syncopal episode today. Patient was scheduled to have a procedure done today for a port placement, and was instructed not to eat or drink anything. On her way to the procedure, the patient fell while walking on wooden stairs. As per family the patient "fell down the stairs butt first".  Positive for loss consciousness and was nonresponsive for about one minute. Family at bedside denies any head trauma. States she believes she would feel better if she had something to eat or drink right now. Patient denies headache, neck, back and hip pain. No further acute complaints at this time.     [facility]- where she receives treatment for lung CA

## 2018-07-02 ENCOUNTER — RESULT REVIEW (OUTPATIENT)
Age: 68
End: 2018-07-02

## 2018-07-02 ENCOUNTER — RX RENEWAL (OUTPATIENT)
Age: 68
End: 2018-07-02

## 2018-07-02 ENCOUNTER — APPOINTMENT (OUTPATIENT)
Dept: INFUSION THERAPY | Facility: CLINIC | Age: 68
End: 2018-07-02

## 2018-07-02 ENCOUNTER — LABORATORY RESULT (OUTPATIENT)
Age: 68
End: 2018-07-02

## 2018-07-02 DIAGNOSIS — Z79.52 LONG TERM (CURRENT) USE OF SYSTEMIC STEROIDS: ICD-10-CM

## 2018-07-02 LAB
BASOPHILS # BLD AUTO: 0.1 K/UL — SIGNIFICANT CHANGE UP (ref 0–0.2)
BASOPHILS NFR BLD AUTO: 0.8 % — SIGNIFICANT CHANGE UP (ref 0–2)
BUN SERPL-MCNC: 10 MG/DL — SIGNIFICANT CHANGE UP (ref 7–23)
CA-I BLDA-SCNC: 1.15 MMOL/L — SIGNIFICANT CHANGE UP (ref 1.12–1.3)
CHLORIDE SERPL-SCNC: 104 MMOL/L — SIGNIFICANT CHANGE UP (ref 96–108)
CO2 SERPL-SCNC: 22 MMOL/L — SIGNIFICANT CHANGE UP (ref 22–31)
CREAT SERPL-MCNC: 0.4 MG/DL — LOW (ref 0.5–1.3)
EOSINOPHIL # BLD AUTO: 0.1 K/UL — SIGNIFICANT CHANGE UP (ref 0–0.5)
EOSINOPHIL NFR BLD AUTO: 1 % — SIGNIFICANT CHANGE UP (ref 0–6)
GLUCOSE SERPL-MCNC: 126 MG/DL — HIGH (ref 70–99)
HCT VFR BLD CALC: 37.9 % — SIGNIFICANT CHANGE UP (ref 34.5–45)
HGB BLD-MCNC: 13.3 G/DL — SIGNIFICANT CHANGE UP (ref 11.5–15.5)
LYMPHOCYTES # BLD AUTO: 1.2 K/UL — SIGNIFICANT CHANGE UP (ref 1–3.3)
LYMPHOCYTES # BLD AUTO: 16.4 % — SIGNIFICANT CHANGE UP (ref 13–44)
MCHC RBC-ENTMCNC: 33.8 PG — SIGNIFICANT CHANGE UP (ref 27–34)
MCHC RBC-ENTMCNC: 35 GM/DL — SIGNIFICANT CHANGE UP (ref 32–36)
MCV RBC AUTO: 96.5 FL — SIGNIFICANT CHANGE UP (ref 80–100)
MONOCYTES # BLD AUTO: 0.6 K/UL — SIGNIFICANT CHANGE UP (ref 0–0.9)
MONOCYTES NFR BLD AUTO: 7.9 % — SIGNIFICANT CHANGE UP (ref 2–14)
NEUTROPHILS # BLD AUTO: 5.6 K/UL — SIGNIFICANT CHANGE UP (ref 1.8–7.4)
NEUTROPHILS NFR BLD AUTO: 73.9 % — SIGNIFICANT CHANGE UP (ref 43–77)
PLATELET # BLD AUTO: 213 K/UL — SIGNIFICANT CHANGE UP (ref 150–400)
POTASSIUM SERPL-MCNC: 3.3 MMOL/L — LOW (ref 3.5–5.3)
POTASSIUM SERPL-SCNC: 3.3 MMOL/L — LOW (ref 3.5–5.3)
RBC # BLD: 3.93 M/UL — SIGNIFICANT CHANGE UP (ref 3.8–5.2)
RBC # FLD: 15.1 % — HIGH (ref 10.3–14.5)
SODIUM SERPL-SCNC: 140 MMOL/L — SIGNIFICANT CHANGE UP (ref 135–145)
WBC # BLD: 7.6 K/UL — SIGNIFICANT CHANGE UP (ref 3.8–10.5)
WBC # FLD AUTO: 7.6 K/UL — SIGNIFICANT CHANGE UP (ref 3.8–10.5)

## 2018-07-03 ENCOUNTER — APPOINTMENT (OUTPATIENT)
Dept: INFUSION THERAPY | Facility: CLINIC | Age: 68
End: 2018-07-03

## 2018-07-03 ENCOUNTER — RX RENEWAL (OUTPATIENT)
Age: 68
End: 2018-07-03

## 2018-07-03 DIAGNOSIS — R11.2 NAUSEA WITH VOMITING, UNSPECIFIED: ICD-10-CM

## 2018-07-03 DIAGNOSIS — Z51.11 ENCOUNTER FOR ANTINEOPLASTIC CHEMOTHERAPY: ICD-10-CM

## 2018-07-03 DIAGNOSIS — E87.6 HYPOKALEMIA: ICD-10-CM

## 2018-07-05 ENCOUNTER — LABORATORY RESULT (OUTPATIENT)
Age: 68
End: 2018-07-05

## 2018-07-05 ENCOUNTER — RESULT REVIEW (OUTPATIENT)
Age: 68
End: 2018-07-05

## 2018-07-05 ENCOUNTER — RX RENEWAL (OUTPATIENT)
Age: 68
End: 2018-07-05

## 2018-07-05 ENCOUNTER — APPOINTMENT (OUTPATIENT)
Dept: INFUSION THERAPY | Facility: CLINIC | Age: 68
End: 2018-07-05

## 2018-07-05 ENCOUNTER — CLINICAL ADVICE (OUTPATIENT)
Age: 68
End: 2018-07-05

## 2018-07-05 DIAGNOSIS — E86.0 DEHYDRATION: ICD-10-CM

## 2018-07-05 DIAGNOSIS — Z79.899 OTHER LONG TERM (CURRENT) DRUG THERAPY: ICD-10-CM

## 2018-07-05 DIAGNOSIS — K05.6 PERIODONTAL DISEASE, UNSPECIFIED: ICD-10-CM

## 2018-07-05 LAB
BUN SERPL-MCNC: 15 MG/DL — SIGNIFICANT CHANGE UP (ref 7–23)
CA-I BLDA-SCNC: 1.15 MMOL/L — SIGNIFICANT CHANGE UP (ref 1.12–1.3)
CHLORIDE SERPL-SCNC: 102 MMOL/L — SIGNIFICANT CHANGE UP (ref 96–108)
CO2 SERPL-SCNC: 26 MMOL/L — SIGNIFICANT CHANGE UP (ref 22–31)
CREAT SERPL-MCNC: 0.4 MG/DL — LOW (ref 0.5–1.3)
GLUCOSE SERPL-MCNC: 158 MG/DL — HIGH (ref 70–99)
POTASSIUM SERPL-MCNC: 4.8 MMOL/L — SIGNIFICANT CHANGE UP (ref 3.5–5.3)
POTASSIUM SERPL-SCNC: 4.8 MMOL/L — SIGNIFICANT CHANGE UP (ref 3.5–5.3)
SODIUM SERPL-SCNC: 136 MMOL/L — SIGNIFICANT CHANGE UP (ref 135–145)

## 2018-07-05 RX ORDER — DIPHENHYDRAMINE HYDROCHLORIDE AND LIDOCAINE HYDROCHLORIDE AND ALUMINUM HYDROXIDE AND MAGNESIUM HYDRO
KIT
Qty: 600 | Refills: 3 | Status: ACTIVE | COMMUNITY
Start: 2018-07-05 | End: 1900-01-01

## 2018-07-12 RX ORDER — ENOXAPARIN SODIUM 100 MG/ML
60 INJECTION SUBCUTANEOUS
Qty: 60 | Refills: 4 | Status: ACTIVE | COMMUNITY
Start: 2018-06-19 | End: 1900-01-01

## 2018-07-12 RX ORDER — LEVETIRACETAM 750 MG/1
750 TABLET, FILM COATED ORAL TWICE DAILY
Qty: 60 | Refills: 0 | Status: ACTIVE | COMMUNITY
Start: 1900-01-01 | End: 1900-01-01

## 2018-07-13 NOTE — ED PROVIDER NOTE - NS ED NOTE AC HIGH RISK COUNTRIES
SONJA ADORNO A MEDICAL STUDENT 18 1:52pm:


Subjective


Subjective/Events-last exam


Pt states feeling improved today. Pt denies any new symptoms.


Review of Systems


Time Seen by Provider:  07:03





Objective


Exam


Last Set of Vital Signs





Vital Signs








  Date Time  Temp Pulse Resp B/P (MAP) Pulse Ox O2 Delivery O2 Flow Rate FiO2


 


18 09:45      Nasal Cannula 2.00 


 


18 07:28     96   


 


18 06:34 99.2 85 20 168/80 (109)    





Capillary Refill :


I&O











Intake and Output 


 


 18





 00:00


 


Intake Total 1500 ml


 


Balance 1500 ml


 


 


 


Intake Oral 1500 ml


 


# Voids 8


 


# Bowel Movements 2


 


Daily Weight Change No








General:  Alert, Oriented X3, Cooperative, No Acute Distress


HEENT:  Atraumatic, EOMI


Neck:  No JVD


Lungs:  Other (tachypneic)


Heart:  Regular Rate


Abdomen:  Normal Bowel Sounds, No Tenderness


Extremities:  No Edema


Neuro:  Normal Speech


Psych/Mental Status:  Mental Status NL





Results/Procedures


Lab





Laboratory Tests








Test


 18


15:37 18


21:02 18


05:37 18


05:40 Range/Units


 


 


Glucometer 120 H 156 H  91    MG/DL


 


White Blood Count


 


 


 14.2 H


 


 4.3-11.0


10^3/uL


 


Red Blood Count


 


 


 3.76 L


 


 4.35-5.85


10^6/uL


 


Hemoglobin   11.6 L  13.3-17.7  G/DL


 


Hematocrit   35 L  40-54  %


 


Mean Corpuscular Volume   94   80-99  FL


 


Mean Corpuscular Hemoglobin   31   25-34  PG


 


Mean Corpuscular Hemoglobin


Concent 


 


 33 


 


 32-36  G/DL





 


Red Cell Distribution Width   16.0 H  10.0-14.5  %


 


Platelet Count


 


 


 138 


 


 130-400


10^3/uL


 


Mean Platelet Volume   11.0 H  7.4-10.4  FL


 


Sodium Level   138   135-145  MMOL/L


 


Potassium Level   3.7   3.6-5.0  MMOL/L


 


Chloride Level   102     MMOL/L


 


Carbon Dioxide Level   26   21-32  MMOL/L


 


Anion Gap   10   5-14  MMOL/L


 


Blood Urea Nitrogen   27 H  7-18  MG/DL


 


Creatinine


 


 


 1.38 H


 


 0.60-1.30


MG/DL


 


Estimat Glomerular Filtration


Rate 


 


 50 


 


  





 


BUN/Creatinine Ratio   20    


 


Glucose Level   95     MG/DL


 


Calcium Level   9.3   8.5-10.1  MG/DL


 


Test


 18


10:49 


 


 


 Range/Units


 


 


Glucometer 141 H      MG/DL





Laboratory Tests


18 15:37: Glucometer 120H


18 21:02: Glucometer 156H


18 05:37: 


White Blood Count 14.2H, Red Blood Count 3.76L, Hemoglobin 11.6L, Hematocrit 35L

, Mean Corpuscular Volume 94, Mean Corpuscular Hemoglobin 31, Mean Corpuscular 

Hemoglobin Concent 33, Red Cell Distribution Width 16.0H, Platelet Count 138, 

Mean Platelet Volume 11.0H, Sodium Level 138, Potassium Level 3.7, Chloride 

Level 102, Carbon Dioxide Level 26, Anion Gap 10, Blood Urea Nitrogen 27H, 

Creatinine 1.38H, Estimat Glomerular Filtration Rate 50, BUN/Creatinine Ratio 20

, Glucose Level 95, Calcium Level 9.3


18 05:40: Glucometer 91


18 10:49: Glucometer 141H





Assessment/Plan


Assessment/Plan


Assessment & Plan


1.  Sepsis w/ bacteremia likely secondary to urinary source


- Adm to ICU 18


- hypotension initially, responsive to IVF; holding BP meds


 clinically improved with improved blood pressure and afebrile last 24 hours

, renal function improved, WBC elevated but did receive steroids in ER, lactic 

acid elevated this am, will give 500 cc bolus and recheck


7/10 Pt remained afebrile and WBC decreased to 18.9, lactic acid is normal


 pt had temperature of 100.0F during the last 24 hours, will continue IV 

abx and consider transitioning pt to swing


 discharged to swing to complete 7 day course of IV antibiotics and 

continue PT


 will give pt last does of IV abx and switch to oral Cefepime. Unable to 

get home health today so will keep pt overnight.





2.  UTI


- Blood cultures prelim show GNR; reviewed clinic records urine culture from  showed P. Mirabilis w/ resistance to Levaquin but sensitive to Cefepime


- started Cefepime and Levaquin 18- culture with proteus resistant to fluoroquinolone, will continue cefepime 

and discontinue levofloxacin


7/10 - will continue pt on cefepime 





3.  R Lower Lobe Pneumonia


- RLL infiltrated noted on CXR


- Currently on Cefepime and Levaquin


 d/c levofloxacin, respiratory status improved as noted below





4.  Acute on Chronic Respiratory Failure


- uses 3L oxygen at home; started on c-pap in ER due to work of breathing


- breathing improved since admission - maintained sats w/o respiratory distress 

off c-pap - will DC and continue oxygen by NC.


 stable on nasal cannula, uses intermittently at home


7/10 pt stable on 2.5L NC





5.  Hyperglycemia w/o prior hx of DM


- no hx of DM diagnosis; reviewed clinic records, last lab was 2017 - 

glucose was 101


- A1c pending


- being treated w/ Novolog SSI; BS running 180-409


- pt received Solu-Medrol and Decadron in the ER which is likely exacerbating 

hyperglycemia - steroids DC as patient has no wheezing.


- will start Levemir 10U in addition to SSI until A1c is back and longer-term 

treatment is determined.


7/10 Pts A1c is 6.2





6.  CAD w/ hx of ventricular arrhythmia s/p defibrillator


- resume home meds: amiodarone, Digoxin, Mexiletine


 adjusted amiodarone dose to match home dosing





7.  CHF


- continue home furosemide and KCl





8.  Hypertension


- will hold Metoprolol, Norvasc due to hypotension


 hypertensive, resumed metoprolol, anticipate will need to resume 

amlodipine soon as well





9. HOLDEN


- baseline Cr from 2017 0.97, GFR >60


- Cr 1.32 -->1.63; continue to monitor; IVF currently running at 60mL/h


 improved this am, continue to monitor


7/10 pt's Creat increased from 1.47 --> 1.58


 pt's creat decreased from 1.58--> 1.44


 creatinine stable at 1.44, IVF discontinued with good intake and 

increasing blood pressure





10.  Electrolyte abnormalities on admission


- Phos low at 1.2 - will replace


- Mag low at 1.7 - replaced


7/10 - Phos now at 3, Mag at 2.1





Clinical Quality Measures


DVT/VTE Risk/Contraindication:


Risk Factor Score Per Nursin


RFS Level Per Nursing on Admit:  4+=Very High





SINGH ELLISON MD 18 2:12pm:


Subjective


Review of Systems


Date Seen by Provider:  2018


Time Seen by Provider:  11:18





Supervisory-Addendum Brief


Supervisory Addendum


Patient seen and examined by me with MS3 Matty Adorno, agree with documentation 

unless otherwise noted. Transition to oral antibiotics tomorrow am, add back 

home amlodipine. Continue SWB for therapy and anticipate d/c Monday.











SONJA ADORNO MEDICAL STUDENT 2018 1:52 pm


SINGH ELLISON MD 2018 2:12 pm
No

## 2018-07-17 ENCOUNTER — OUTPATIENT (OUTPATIENT)
Dept: OUTPATIENT SERVICES | Facility: HOSPITAL | Age: 68
LOS: 1 days | Discharge: HOME | End: 2018-07-17

## 2018-07-17 DIAGNOSIS — C34.90 MALIGNANT NEOPLASM OF UNSPECIFIED PART OF UNSPECIFIED BRONCHUS OR LUNG: ICD-10-CM

## 2018-07-17 DIAGNOSIS — Z98.890 OTHER SPECIFIED POSTPROCEDURAL STATES: Chronic | ICD-10-CM

## 2018-07-18 PROBLEM — C34.90 MALIGNANT NEOPLASM OF UNSPECIFIED PART OF UNSPECIFIED BRONCHUS OR LUNG: Chronic | Status: ACTIVE | Noted: 2018-05-17

## 2018-07-19 ENCOUNTER — RESULT REVIEW (OUTPATIENT)
Age: 68
End: 2018-07-19

## 2018-07-19 ENCOUNTER — APPOINTMENT (OUTPATIENT)
Dept: HEMATOLOGY ONCOLOGY | Facility: CLINIC | Age: 68
End: 2018-07-19
Payer: MEDICARE

## 2018-07-19 ENCOUNTER — OTHER (OUTPATIENT)
Age: 68
End: 2018-07-19

## 2018-07-19 VITALS
HEIGHT: 62 IN | OXYGEN SATURATION: 87 % | TEMPERATURE: 97.9 F | DIASTOLIC BLOOD PRESSURE: 69 MMHG | HEART RATE: 83 BPM | SYSTOLIC BLOOD PRESSURE: 107 MMHG

## 2018-07-19 DIAGNOSIS — R94.5 ABNORMAL RESULTS OF LIVER FUNCTION STUDIES: ICD-10-CM

## 2018-07-19 LAB
ANISOCYTOSIS BLD QL: SIGNIFICANT CHANGE UP
BASO STIPL BLD QL SMEAR: PRESENT — SIGNIFICANT CHANGE UP
BASOPHILS # BLD AUTO: 0.1 K/UL — SIGNIFICANT CHANGE UP (ref 0–0.2)
EOSINOPHIL # BLD AUTO: 0.3 K/UL — SIGNIFICANT CHANGE UP (ref 0–0.5)
HCT VFR BLD CALC: 35.7 % — SIGNIFICANT CHANGE UP (ref 34.5–45)
HGB BLD-MCNC: 12.8 G/DL — SIGNIFICANT CHANGE UP (ref 11.5–15.5)
HYPOCHROMIA BLD QL: SLIGHT — SIGNIFICANT CHANGE UP
LYMPHOCYTES # BLD AUTO: 1.1 K/UL — SIGNIFICANT CHANGE UP (ref 1–3.3)
LYMPHOCYTES # BLD AUTO: 33 % — SIGNIFICANT CHANGE UP (ref 13–44)
MACROCYTES BLD QL: SLIGHT — SIGNIFICANT CHANGE UP
MCHC RBC-ENTMCNC: 34.3 PG — HIGH (ref 27–34)
MCHC RBC-ENTMCNC: 36 GM/DL — SIGNIFICANT CHANGE UP (ref 32–36)
MCV RBC AUTO: 95.3 FL — SIGNIFICANT CHANGE UP (ref 80–100)
METAMYELOCYTES # FLD: 9 % — HIGH (ref 0–0)
MICROCYTES BLD QL: SLIGHT — SIGNIFICANT CHANGE UP
MONOCYTES # BLD AUTO: 0.6 K/UL — SIGNIFICANT CHANGE UP (ref 0–0.9)
MONOCYTES NFR BLD AUTO: 9 % — SIGNIFICANT CHANGE UP (ref 2–14)
MYELOCYTES NFR BLD: 11 % — HIGH (ref 0–0)
NEUTROPHILS # BLD AUTO: 2.3 K/UL — SIGNIFICANT CHANGE UP (ref 1.8–7.4)
NEUTROPHILS NFR BLD AUTO: 32 % — LOW (ref 43–77)
NEUTS BAND # BLD: 4 % — SIGNIFICANT CHANGE UP (ref 0–8)
NRBC # BLD: 2 /100 — HIGH (ref 0–0)
OVALOCYTES BLD QL SMEAR: SLIGHT — SIGNIFICANT CHANGE UP
PLAT MORPH BLD: NORMAL — SIGNIFICANT CHANGE UP
PLATELET # BLD AUTO: 316 K/UL — SIGNIFICANT CHANGE UP (ref 150–400)
POIKILOCYTOSIS BLD QL AUTO: SLIGHT — SIGNIFICANT CHANGE UP
POLYCHROMASIA BLD QL SMEAR: SIGNIFICANT CHANGE UP
PROMYELOCYTES # FLD: 2 % — HIGH (ref 0–0)
RBC # BLD: 3.75 M/UL — LOW (ref 3.8–5.2)
RBC # FLD: 14 % — SIGNIFICANT CHANGE UP (ref 10.3–14.5)
RBC BLD AUTO: ABNORMAL
WBC # BLD: 4.5 K/UL — SIGNIFICANT CHANGE UP (ref 3.8–10.5)
WBC # FLD AUTO: 4.5 K/UL — SIGNIFICANT CHANGE UP (ref 3.8–10.5)

## 2018-07-19 PROCEDURE — 99214 OFFICE O/P EST MOD 30 MIN: CPT

## 2018-07-19 RX ORDER — POTASSIUM CHLORIDE 750 MG/1
10 CAPSULE, EXTENDED RELEASE ORAL
Qty: 4 | Refills: 0 | Status: DISCONTINUED | COMMUNITY
Start: 2018-07-03 | End: 2018-07-19

## 2018-07-23 ENCOUNTER — RESULT REVIEW (OUTPATIENT)
Age: 68
End: 2018-07-23

## 2018-07-23 ENCOUNTER — APPOINTMENT (OUTPATIENT)
Dept: INFUSION THERAPY | Facility: CLINIC | Age: 68
End: 2018-07-23

## 2018-07-23 LAB
BASOPHILS # BLD AUTO: 0.1 K/UL — SIGNIFICANT CHANGE UP (ref 0–0.2)
BASOPHILS NFR BLD AUTO: 0.7 % — SIGNIFICANT CHANGE UP (ref 0–2)
EOSINOPHIL # BLD AUTO: 0.1 K/UL — SIGNIFICANT CHANGE UP (ref 0–0.5)
EOSINOPHIL NFR BLD AUTO: 1.3 % — SIGNIFICANT CHANGE UP (ref 0–6)
HCT VFR BLD CALC: 32.8 % — LOW (ref 34.5–45)
HGB BLD-MCNC: 11.7 G/DL — SIGNIFICANT CHANGE UP (ref 11.5–15.5)
LYMPHOCYTES # BLD AUTO: 18.6 % — SIGNIFICANT CHANGE UP (ref 13–44)
LYMPHOCYTES # BLD AUTO: 2 K/UL — SIGNIFICANT CHANGE UP (ref 1–3.3)
MCHC RBC-ENTMCNC: 35 PG — HIGH (ref 27–34)
MCHC RBC-ENTMCNC: 35.8 GM/DL — SIGNIFICANT CHANGE UP (ref 32–36)
MCV RBC AUTO: 97.7 FL — SIGNIFICANT CHANGE UP (ref 80–100)
MONOCYTES # BLD AUTO: 1.1 K/UL — HIGH (ref 0–0.9)
MONOCYTES NFR BLD AUTO: 10.1 % — SIGNIFICANT CHANGE UP (ref 2–14)
NEUTROPHILS # BLD AUTO: 7.4 K/UL — SIGNIFICANT CHANGE UP (ref 1.8–7.4)
NEUTROPHILS NFR BLD AUTO: 69.3 % — SIGNIFICANT CHANGE UP (ref 43–77)
PLATELET # BLD AUTO: 230 K/UL — SIGNIFICANT CHANGE UP (ref 150–400)
RBC # BLD: 3.36 M/UL — LOW (ref 3.8–5.2)
RBC # FLD: 15.7 % — HIGH (ref 10.3–14.5)
WBC # BLD: 10.6 K/UL — HIGH (ref 3.8–10.5)
WBC # FLD AUTO: 10.6 K/UL — HIGH (ref 3.8–10.5)

## 2018-07-24 ENCOUNTER — APPOINTMENT (OUTPATIENT)
Dept: INFUSION THERAPY | Facility: CLINIC | Age: 68
End: 2018-07-24

## 2018-07-25 ENCOUNTER — APPOINTMENT (OUTPATIENT)
Dept: HEMATOLOGY ONCOLOGY | Facility: CLINIC | Age: 68
End: 2018-07-25
Payer: MEDICARE

## 2018-07-25 ENCOUNTER — APPOINTMENT (OUTPATIENT)
Dept: INFUSION THERAPY | Facility: CLINIC | Age: 68
End: 2018-07-25

## 2018-07-25 ENCOUNTER — APPOINTMENT (OUTPATIENT)
Dept: RADIATION ONCOLOGY | Facility: CLINIC | Age: 68
End: 2018-07-25

## 2018-07-25 VITALS
BODY MASS INDEX: 25.21 KG/M2 | SYSTOLIC BLOOD PRESSURE: 99 MMHG | HEIGHT: 62 IN | HEART RATE: 93 BPM | WEIGHT: 137 LBS | TEMPERATURE: 97.9 F | DIASTOLIC BLOOD PRESSURE: 66 MMHG | OXYGEN SATURATION: 100 %

## 2018-07-25 DIAGNOSIS — C79.51 SECONDARY MALIGNANT NEOPLASM OF BONE: ICD-10-CM

## 2018-07-25 DIAGNOSIS — Z92.3 PERSONAL HISTORY OF IRRADIATION: ICD-10-CM

## 2018-07-25 PROCEDURE — 99024 POSTOP FOLLOW-UP VISIT: CPT

## 2018-07-25 PROCEDURE — 99214 OFFICE O/P EST MOD 30 MIN: CPT

## 2018-07-30 ENCOUNTER — APPOINTMENT (OUTPATIENT)
Dept: INFUSION THERAPY | Facility: CLINIC | Age: 68
End: 2018-07-30

## 2018-07-31 ENCOUNTER — APPOINTMENT (OUTPATIENT)
Dept: INFUSION THERAPY | Facility: CLINIC | Age: 68
End: 2018-07-31

## 2018-08-01 ENCOUNTER — APPOINTMENT (OUTPATIENT)
Dept: INFUSION THERAPY | Facility: CLINIC | Age: 68
End: 2018-08-01

## 2018-08-01 ENCOUNTER — FORM ENCOUNTER (OUTPATIENT)
Age: 68
End: 2018-08-01

## 2018-08-01 LAB
ALBUMIN SERPL ELPH-MCNC: 3.8 G/DL
ALP BLD-CCNC: 203 U/L
ALT SERPL-CCNC: 84 U/L
ANION GAP SERPL CALC-SCNC: 16 MMOL/L
AST SERPL-CCNC: 36 U/L
BILIRUB SERPL-MCNC: 0.3 MG/DL
BUN SERPL-MCNC: 12 MG/DL
CALCIUM SERPL-MCNC: 9.1 MG/DL
CHLORIDE SERPL-SCNC: 100 MMOL/L
CO2 SERPL-SCNC: 21 MMOL/L
CREAT SERPL-MCNC: 0.57 MG/DL
GLUCOSE SERPL-MCNC: 231 MG/DL
LEVETIRACETAM SERPL-MCNC: 23.7 MCG/ML
POTASSIUM SERPL-SCNC: 4 MMOL/L
PROT SERPL-MCNC: 6.2 G/DL
SODIUM SERPL-SCNC: 137 MMOL/L

## 2018-08-02 ENCOUNTER — APPOINTMENT (OUTPATIENT)
Dept: MRI IMAGING | Facility: CLINIC | Age: 68
End: 2018-08-02
Payer: MEDICARE

## 2018-08-02 ENCOUNTER — OUTPATIENT (OUTPATIENT)
Dept: OUTPATIENT SERVICES | Facility: HOSPITAL | Age: 68
LOS: 1 days | End: 2018-08-02

## 2018-08-02 DIAGNOSIS — Z98.890 OTHER SPECIFIED POSTPROCEDURAL STATES: Chronic | ICD-10-CM

## 2018-08-02 DIAGNOSIS — C79.31 SECONDARY MALIGNANT NEOPLASM OF BRAIN: ICD-10-CM

## 2018-08-02 PROBLEM — C79.51 BONE METASTASES: Status: ACTIVE | Noted: 2018-07-19

## 2018-08-02 PROCEDURE — 74177 CT ABD & PELVIS W/CONTRAST: CPT | Mod: 26

## 2018-08-02 PROCEDURE — 70553 MRI BRAIN STEM W/O & W/DYE: CPT | Mod: 26

## 2018-08-02 PROCEDURE — 71260 CT THORAX DX C+: CPT | Mod: 26

## 2018-08-03 RX ORDER — DEXAMETHASONE 4 MG/1
4 TABLET ORAL
Qty: 30 | Refills: 0 | Status: ACTIVE | COMMUNITY

## 2018-08-10 ENCOUNTER — APPOINTMENT (OUTPATIENT)
Dept: HEMATOLOGY ONCOLOGY | Facility: CLINIC | Age: 68
End: 2018-08-10
Payer: MEDICARE

## 2018-08-10 DIAGNOSIS — C79.31 SECONDARY MALIGNANT NEOPLASM OF BRAIN: ICD-10-CM

## 2018-08-10 DIAGNOSIS — C34.92 MALIGNANT NEOPLASM OF UNSPECIFIED PART OF LEFT BRONCHUS OR LUNG: ICD-10-CM

## 2018-08-10 DIAGNOSIS — C78.7 SECONDARY MALIGNANT NEOPLASM OF LIVER AND INTRAHEPATIC BILE DUCT: ICD-10-CM

## 2018-08-10 DIAGNOSIS — Z71.89 OTHER SPECIFIED COUNSELING: ICD-10-CM

## 2018-08-10 PROCEDURE — 99215 OFFICE O/P EST HI 40 MIN: CPT

## 2018-08-14 ENCOUNTER — RX RENEWAL (OUTPATIENT)
Age: 68
End: 2018-08-14

## 2018-08-14 RX ORDER — RISPERIDONE 0.5 MG/1
0.5 TABLET, FILM COATED ORAL TWICE DAILY
Qty: 15 | Refills: 0 | Status: ACTIVE | COMMUNITY
Start: 2018-06-19

## 2018-08-14 RX ORDER — FAMOTIDINE 40 MG/1
40 TABLET, FILM COATED ORAL DAILY
Qty: 15 | Refills: 3 | Status: ACTIVE | COMMUNITY
Start: 2018-07-02 | End: 1900-01-01

## 2020-12-30 NOTE — PATIENT PROFILE ADULT. - PRO SERVICES AT [facility]
Is This A New Presentation, Or A Follow-Up?: Skin Lesions How Severe Is Your Skin Lesion?: mild unsure

## 2021-01-18 NOTE — PROGRESS NOTE BEHAVIORAL HEALTH - ORIENTED TO PLACE
No
Modified Advancement Flap Text: The defect edges were debeveled with a #15 scalpel blade.  Given the location of the defect, shape of the defect and the proximity to free margins a modified advancement flap was deemed most appropriate.  Using a sterile surgical marker, an appropriate advancement flap was drawn incorporating the defect and placing the expected incisions within the relaxed skin tension lines where possible.    The area thus outlined was incised deep to adipose tissue with a #15 scalpel blade.  The skin margins were undermined to an appropriate distance in all directions utilizing iris scissors.
No
No

## 2021-01-21 ENCOUNTER — TRANSCRIPTION ENCOUNTER (OUTPATIENT)
Age: 71
End: 2021-01-21

## 2021-06-30 NOTE — DISCHARGE NOTE ADULT - NS MD DC FALL RISK RISK
H/O bilateral mastectomy  with alloderm 9/ 2018  H/O ovarian cystectomy    H/O partial thyroidectomy    H/O rhinoplasty    History of tonsillectomy    S/P breast lumpectomy  right 1998  S/P ORIF (open reduction internal fixation) fracture  right wrist 07/2017   For information on Fall & Injury Prevention, visit [email or web address]

## 2022-10-18 NOTE — PHYSICAL THERAPY INITIAL EVALUATION ADULT - PHYSICAL ASSIST/NONPHYSICAL ASSIST: GAIT, REHAB EVAL
Problem: VTE, Risk for  Goal: # No s/s of VTE  Outcome: Outcome Met, Continue evaluating goal progress toward completion  Goal: # Verbalizes understanding of VTE risk factors and prevention  Description: Document education using the patient education activity.   Outcome: Outcome Met, Continue evaluating goal progress toward completion  Goal: Demonstrates ability to administer injectable anticoagulants if ordered for d/c  Description: Document education using the patient education activity.  Outcome: Outcome Met, Continue evaluating goal progress toward completion     Problem: At Risk for Falls  Goal: # Patient does not fall  Outcome: Outcome Met, Continue evaluating goal progress toward completion  Goal: # Takes action to control fall-related risks  Outcome: Outcome Met, Continue evaluating goal progress toward completion  Goal: # Verbalizes understanding of fall risk/precautions  Description: Document education using the patient education activity  Outcome: Outcome Met, Continue evaluating goal progress toward completion     Problem: ACS/AMI  Goal: Achieves chest pain control (Pain Score = 0 - 1, no episodes)  Description: Chest pain control = Pain Score = 0-1, no episodes of pain  Outcome: Outcome Met, Continue evaluating goal progress toward completion  Goal: Anxiety is controlled  Outcome: Outcome Met, Continue evaluating goal progress toward completion  Goal: Hemodynamic stability achieved/maintained  Description: AHA guidelines: Keep BP greater than 90 mm Hg.  Monitor for new or unstable rhythm changes  Outcome: Outcome Met, Continue evaluating goal progress toward completion  Goal: Tolerates activity without s/s of intolerance  Outcome: Outcome Met, Continue evaluating goal progress toward completion  Goal: Verbalizes understanding of rhythm disturbance, treatment procedure and pre, post-, and d/c care specific to intervention  Description: Document on Patient Education Activity  Outcome: Outcome Met,  Continue evaluating goal progress toward completion      1 person assist

## 2023-11-21 NOTE — CONSULT NOTE ADULT - PROBLEM SELECTOR RECOMMENDATION 9
Airway    Performed by: Michael Willis MD  Authorized by: Michael Willis MD    Final Airway Type:  Endotracheal airway  Final Endotracheal Airway*:  ETT  ETT Size (mm)*:  7.0  Cuff*:  Regular  Technique Used for Successful ETT Placement:  Video laryngoscopy  Devices/Methods Used in Placement*:  Mask  Intubation Procedure*:  ETCO2, Preoxygenation, Atraumatic, Dentition Unchanged and Pharynx Clear  Insertion Site:  Oral  Blade Type*:  Video Laryngoscope (meredith)  Blade Size*:  3  Measured from*:  Lips  Secured at (cm)*:  21  Placement Verified by: auscultation, capnometry and palpation of cuff    Glottic View*:  1 - full view of glottis  Attempts*:  1   Patient Identified, Procedure confirmed, Emergency equipment available and Safety protocols followed  Location:  OR  Urgency:  Elective  Difficult Airway: No    Indications for Airway Management:  Anesthesia  Spontaneous Ventilation: absent    Sedation Level:  Anesthetized  Mask Difficulty Assessment:  1 - vent by mask  Start Time: 11/21/2023 2:53 PM   Elective [facility]       Unclear plan.  Will reach out to family

## 2024-01-01 NOTE — PROGRESS NOTE BEHAVIORAL HEALTH - MUSCLE TONE / STRENGTH

## 2024-03-06 NOTE — DISCHARGE NOTE ADULT - PROVIDER TOKENS
chest, general
FREE:[LAST:[cibo],FIRST:[Oncologist],PHONE:[(   )    -],FAX:[(   )    -],ADDRESS:[[facility]]]

## 2025-07-01 NOTE — ED ADULT NURSE NOTE - CAS TRG GEN SKIN COLOR
Duplicate  
Pt called the office 6/30/25 stating that the pharmacy stated that her Rx for Hydralazine 50 mg was canceled.  Pt wants to know if Rx has been canceled and why?  The pt last ov was 6/17/25.  
Normal for race